# Patient Record
Sex: MALE | Race: WHITE | NOT HISPANIC OR LATINO | Employment: OTHER | URBAN - METROPOLITAN AREA
[De-identification: names, ages, dates, MRNs, and addresses within clinical notes are randomized per-mention and may not be internally consistent; named-entity substitution may affect disease eponyms.]

---

## 2017-12-19 ENCOUNTER — HOSPITAL ENCOUNTER (OUTPATIENT)
Dept: GENERAL RADIOLOGY | Facility: HOSPITAL | Age: 62
Discharge: HOME | End: 2017-12-19
Admitting: NURSE PRACTITIONER

## 2017-12-19 ENCOUNTER — TRANSCRIBE ORDERS (OUTPATIENT)
Dept: ADMINISTRATIVE | Facility: HOSPITAL | Age: 62
End: 2017-12-19

## 2017-12-19 DIAGNOSIS — M79.672 LEFT FOOT PAIN: Primary | ICD-10-CM

## 2017-12-19 PROCEDURE — 73630 X-RAY EXAM OF FOOT: CPT

## 2019-01-15 RX ORDER — ALLOPURINOL 300 MG/1
TABLET ORAL
Qty: 90 TABLET | Refills: 1 | Status: SHIPPED | OUTPATIENT
Start: 2019-01-15 | End: 2019-07-14 | Stop reason: SDUPTHER

## 2019-01-15 RX ORDER — ATORVASTATIN CALCIUM 40 MG/1
TABLET, FILM COATED ORAL
Qty: 90 TABLET | Refills: 1 | Status: SHIPPED | OUTPATIENT
Start: 2019-01-15 | End: 2019-07-04 | Stop reason: SDUPTHER

## 2019-01-22 RX ORDER — TESTOSTERONE 30 MG/1.5ML
SOLUTION TOPICAL
Qty: 270 ML | Refills: 0 | Status: SHIPPED | OUTPATIENT
Start: 2019-01-22 | End: 2019-05-01 | Stop reason: SDUPTHER

## 2019-02-07 RX ORDER — WARFARIN SODIUM 5 MG/1
TABLET ORAL
Qty: 45 TABLET | Refills: 0 | Status: SHIPPED | OUTPATIENT
Start: 2019-02-07 | End: 2019-03-15 | Stop reason: SDUPTHER

## 2019-02-13 ENCOUNTER — CLINICAL SUPPORT (OUTPATIENT)
Dept: INTERNAL MEDICINE | Facility: CLINIC | Age: 64
End: 2019-02-13

## 2019-02-13 DIAGNOSIS — I48.91 ATRIAL FIBRILLATION, UNSPECIFIED TYPE (HCC): Primary | ICD-10-CM

## 2019-02-13 PROCEDURE — 85610 PROTHROMBIN TIME: CPT | Performed by: INTERNAL MEDICINE

## 2019-02-14 LAB
EXPIRATION DATE: ABNORMAL
INR PPP: 2.3 (ref 0.9–1.1)
Lab: ABNORMAL
PROTHROMBIN TIME: 27.3 SECONDS

## 2019-03-05 PROBLEM — J45.30 MILD PERSISTENT ASTHMA: Status: ACTIVE | Noted: 2019-03-05

## 2019-03-05 PROBLEM — K21.9 GERD (GASTROESOPHAGEAL REFLUX DISEASE): Status: ACTIVE | Noted: 2019-03-05

## 2019-03-05 PROBLEM — R03.0 ELEVATED BLOOD PRESSURE READING: Status: ACTIVE | Noted: 2019-03-05

## 2019-03-05 PROBLEM — E78.5 HLD (HYPERLIPIDEMIA): Status: ACTIVE | Noted: 2019-03-05

## 2019-03-05 PROBLEM — M79.673 FOOT PAIN: Status: ACTIVE | Noted: 2019-03-05

## 2019-03-05 PROBLEM — I26.99 PULMONARY EMBOLISM (HCC): Status: ACTIVE | Noted: 2019-03-05

## 2019-03-05 PROBLEM — I10 BENIGN ESSENTIAL HYPERTENSION: Status: ACTIVE | Noted: 2019-03-05

## 2019-03-05 PROBLEM — I87.009 POSTPHLEBITIC SYNDROME: Status: ACTIVE | Noted: 2019-03-05

## 2019-03-05 PROBLEM — D68.59 HYPERCOAGULABLE STATE: Status: ACTIVE | Noted: 2019-03-05

## 2019-03-05 PROBLEM — E29.1 MALE HYPOGONADISM: Status: ACTIVE | Noted: 2019-03-05

## 2019-03-05 PROBLEM — R06.09 DYSPNEA ON EXERTION: Status: ACTIVE | Noted: 2019-03-05

## 2019-03-05 PROBLEM — J30.9 AR (ALLERGIC RHINITIS): Status: ACTIVE | Noted: 2019-03-05

## 2019-03-15 RX ORDER — WARFARIN SODIUM 5 MG/1
TABLET ORAL
Qty: 45 TABLET | Refills: 0 | Status: SHIPPED | OUTPATIENT
Start: 2019-03-15 | End: 2019-04-21 | Stop reason: SDUPTHER

## 2019-03-28 ENCOUNTER — TELEPHONE (OUTPATIENT)
Dept: INTERNAL MEDICINE | Facility: CLINIC | Age: 64
End: 2019-03-28

## 2019-03-28 DIAGNOSIS — D68.59 HYPERCOAGULABLE STATE (HCC): Primary | ICD-10-CM

## 2019-03-28 RX ORDER — LISINOPRIL 5 MG/1
TABLET ORAL
Qty: 90 TABLET | Refills: 1 | Status: SHIPPED | OUTPATIENT
Start: 2019-03-28 | End: 2019-09-15 | Stop reason: SDUPTHER

## 2019-03-29 ENCOUNTER — CLINICAL SUPPORT (OUTPATIENT)
Dept: INTERNAL MEDICINE | Facility: CLINIC | Age: 64
End: 2019-03-29

## 2019-03-29 DIAGNOSIS — D68.59 HYPERCOAGULABLE STATE (HCC): Primary | ICD-10-CM

## 2019-03-29 LAB
EXPIRATION DATE: ABNORMAL
INR PPP: 3.3 (ref 0.9–1.1)
Lab: ABNORMAL
PROTHROMBIN TIME: 39.6 SECONDS

## 2019-03-29 PROCEDURE — 85610 PROTHROMBIN TIME: CPT | Performed by: INTERNAL MEDICINE

## 2019-04-01 ENCOUNTER — OFFICE VISIT (OUTPATIENT)
Dept: INTERNAL MEDICINE | Facility: CLINIC | Age: 64
End: 2019-04-01

## 2019-04-01 VITALS
HEIGHT: 75 IN | SYSTOLIC BLOOD PRESSURE: 136 MMHG | WEIGHT: 256.5 LBS | DIASTOLIC BLOOD PRESSURE: 84 MMHG | HEART RATE: 72 BPM | BODY MASS INDEX: 31.89 KG/M2

## 2019-04-01 DIAGNOSIS — Z12.5 PROSTATE CANCER SCREENING: ICD-10-CM

## 2019-04-01 DIAGNOSIS — E78.2 MIXED HYPERLIPIDEMIA: ICD-10-CM

## 2019-04-01 DIAGNOSIS — D68.59 HYPERCOAGULABLE STATE (HCC): ICD-10-CM

## 2019-04-01 DIAGNOSIS — I10 BENIGN ESSENTIAL HYPERTENSION: Primary | ICD-10-CM

## 2019-04-01 DIAGNOSIS — I87.009 POSTPHLEBITIC SYNDROME: ICD-10-CM

## 2019-04-01 DIAGNOSIS — K21.9 GASTROESOPHAGEAL REFLUX DISEASE WITHOUT ESOPHAGITIS: ICD-10-CM

## 2019-04-01 DIAGNOSIS — E29.1 MALE HYPOGONADISM: ICD-10-CM

## 2019-04-01 DIAGNOSIS — I26.99 OTHER PULMONARY EMBOLISM WITHOUT ACUTE COR PULMONALE, UNSPECIFIED CHRONICITY (HCC): ICD-10-CM

## 2019-04-01 LAB
ALBUMIN SERPL-MCNC: 4.69 G/DL (ref 3.2–4.8)
ALBUMIN/GLOB SERPL: 2 G/DL (ref 1.5–2.5)
ALP SERPL-CCNC: 125 U/L (ref 25–100)
ALT SERPL W P-5'-P-CCNC: 34 U/L (ref 7–40)
ANION GAP SERPL CALCULATED.3IONS-SCNC: 8 MMOL/L (ref 3–11)
ARTICHOKE IGE QN: 97 MG/DL (ref 0–130)
AST SERPL-CCNC: 29 U/L (ref 0–33)
BASOPHILS # BLD AUTO: 0.04 10*3/MM3 (ref 0–0.2)
BASOPHILS NFR BLD AUTO: 0.8 % (ref 0–1)
BILIRUB SERPL-MCNC: 1.4 MG/DL (ref 0.3–1.2)
BUN BLD-MCNC: 15 MG/DL (ref 9–23)
BUN/CREAT SERPL: 12.3 (ref 7–25)
CALCIUM SPEC-SCNC: 9.8 MG/DL (ref 8.7–10.4)
CHLORIDE SERPL-SCNC: 103 MMOL/L (ref 99–109)
CHOLEST SERPL-MCNC: 136 MG/DL (ref 0–200)
CO2 SERPL-SCNC: 27 MMOL/L (ref 20–31)
CREAT BLD-MCNC: 1.22 MG/DL (ref 0.6–1.3)
DEPRECATED RDW RBC AUTO: 49.9 FL (ref 37–54)
EOSINOPHIL # BLD AUTO: 0.05 10*3/MM3 (ref 0–0.3)
EOSINOPHIL NFR BLD AUTO: 1.1 % (ref 0–3)
ERYTHROCYTE [DISTWIDTH] IN BLOOD BY AUTOMATED COUNT: 14.9 % (ref 11.3–14.5)
GFR SERPL CREATININE-BSD FRML MDRD: 60 ML/MIN/1.73
GLOBULIN UR ELPH-MCNC: 2.3 GM/DL
GLUCOSE BLD-MCNC: 86 MG/DL (ref 70–100)
HCT VFR BLD AUTO: 46.8 % (ref 38.9–50.9)
HDLC SERPL-MCNC: 27 MG/DL (ref 40–60)
HGB BLD-MCNC: 15.7 G/DL (ref 13.1–17.5)
IMM GRANULOCYTES # BLD AUTO: 0.01 10*3/MM3 (ref 0–0.05)
IMM GRANULOCYTES NFR BLD AUTO: 0.2 % (ref 0–0.6)
LYMPHOCYTES # BLD AUTO: 1.09 10*3/MM3 (ref 0.6–4.8)
LYMPHOCYTES NFR BLD AUTO: 23 % (ref 24–44)
MCH RBC QN AUTO: 30.6 PG (ref 27–31)
MCHC RBC AUTO-ENTMCNC: 33.5 G/DL (ref 32–36)
MCV RBC AUTO: 91.2 FL (ref 80–99)
MONOCYTES # BLD AUTO: 0.27 10*3/MM3 (ref 0–1)
MONOCYTES NFR BLD AUTO: 5.7 % (ref 0–12)
NEUTROPHILS # BLD AUTO: 3.29 10*3/MM3 (ref 1.5–8.3)
NEUTROPHILS NFR BLD AUTO: 69.4 % (ref 41–71)
PLATELET # BLD AUTO: 150 10*3/MM3 (ref 150–450)
PMV BLD AUTO: 10.5 FL (ref 6–12)
POTASSIUM BLD-SCNC: 4.9 MMOL/L (ref 3.5–5.5)
PROT SERPL-MCNC: 7 G/DL (ref 5.7–8.2)
PSA SERPL-MCNC: 1.29 NG/ML (ref 0–4)
RBC # BLD AUTO: 5.13 10*6/MM3 (ref 4.2–5.76)
SODIUM BLD-SCNC: 138 MMOL/L (ref 132–146)
TESTOST SERPL-MCNC: 281.33 NG/DL (ref 86.98–780.1)
TRIGL SERPL-MCNC: 98 MG/DL (ref 0–150)
WBC NRBC COR # BLD: 4.74 10*3/MM3 (ref 3.5–10.8)

## 2019-04-01 PROCEDURE — G0103 PSA SCREENING: HCPCS | Performed by: INTERNAL MEDICINE

## 2019-04-01 PROCEDURE — 84403 ASSAY OF TOTAL TESTOSTERONE: CPT | Performed by: INTERNAL MEDICINE

## 2019-04-01 PROCEDURE — 99396 PREV VISIT EST AGE 40-64: CPT | Performed by: INTERNAL MEDICINE

## 2019-04-01 PROCEDURE — 80061 LIPID PANEL: CPT | Performed by: INTERNAL MEDICINE

## 2019-04-01 PROCEDURE — 85025 COMPLETE CBC W/AUTO DIFF WBC: CPT | Performed by: INTERNAL MEDICINE

## 2019-04-01 PROCEDURE — 82043 UR ALBUMIN QUANTITATIVE: CPT | Performed by: INTERNAL MEDICINE

## 2019-04-01 PROCEDURE — 36415 COLL VENOUS BLD VENIPUNCTURE: CPT | Performed by: INTERNAL MEDICINE

## 2019-04-01 PROCEDURE — 82570 ASSAY OF URINE CREATININE: CPT | Performed by: INTERNAL MEDICINE

## 2019-04-01 PROCEDURE — 80053 COMPREHEN METABOLIC PANEL: CPT | Performed by: INTERNAL MEDICINE

## 2019-04-01 RX ORDER — FAMOTIDINE 20 MG/1
1 TABLET, FILM COATED ORAL AS NEEDED
COMMUNITY
Start: 2015-03-25

## 2019-04-01 NOTE — PROGRESS NOTES
[facility]     Joseph Lundberg  1955   [id number]      Patient Care Team:  Jairo Carter MD as PCP - General (Internal Medicine)    Chief Complaint::   Chief Complaint   Patient presents with   • Annual Exam        HPI  Mr Lundberg is now 63.  He comes in for follow-up of his hypertension, hypercoagulable state, GERD, hyperlipidemia, hypogonadism, postphlebitic syndrome and for annual examination.  Overall he feels well.  He admits he needs to exercise more but feels well and has no shortness of breath, his strength and stamina are good.  He has no complaints    Chronic Conditions:      Patient Active Problem List   Diagnosis   • AR (allergic rhinitis)   • Benign essential hypertension   • Dyspnea on exertion   • Elevated blood pressure reading   • Foot pain   • GERD (gastroesophageal reflux disease)   • Hypercoagulable state (CMS/HCC)   • HLD (hyperlipidemia)   • Male hypogonadism   • Mild persistent asthma   • Postphlebitic syndrome   • Pulmonary embolism (CMS/HCC)        Past Medical History:   Diagnosis Date   • Achilles tendon injury    • Nephrolithiasis      (uric acid/calcium)   • Pulmonary embolism (CMS/HCC) 2011   • Pulmonary embolism (CMS/HCC) 2002    DVT   • Pulmonary embolism (CMS/HCC) 2010    DVT    • Torn meniscus 2002    left    • Torn meniscus 2008    left       Past Surgical History:   Procedure Laterality Date   • ACHILLES TENDON REPAIR  2010   • FL CYSTO URETHROSCOPY W REM FB     • KNEE ARTHROSCOPY W/ MENISCAL REPAIR Left 2008   • KNEE ARTHROSCOPY W/ MENISCAL REPAIR Left 2002   • TONSILLECTOMY     • VASECTOMY  1994       Family History   Problem Relation Age of Onset   • Heart attack Mother 73   • Heart attack Father 28        and 62   • Coronary artery disease Brother 43        premature    • Heart attack Paternal Uncle 61       Social History     Socioeconomic History   • Marital status:      Spouse name: Not on file   • Number of children: Not on file   • Years  of education: Not on file   • Highest education level: Not on file   Tobacco Use   • Smoking status: Never Smoker       Allergies   Allergen Reactions   • Penicillins Unknown (See Comments)     Unknown         Current Outpatient Medications:   •  allopurinol (ZYLOPRIM) 300 MG tablet, TAKE 1 TABLET DAILY, Disp: 90 tablet, Rfl: 1  •  atorvastatin (LIPITOR) 40 MG tablet, TAKE 1 TABLET DAILY, Disp: 90 tablet, Rfl: 1  •  famotidine (PEPCID) 20 MG tablet, Take 1 tablet by mouth As Needed., Disp: , Rfl:   •  lisinopril (PRINIVIL,ZESTRIL) 5 MG tablet, TAKE 1 TABLET DAILY, Disp: 90 tablet, Rfl: 1  •  Testosterone 30 MG/ACT solution, APPLY 1 PUMP TO EACH UNDERARM EVERY MORNING, Disp: 270 mL, Rfl: 0  •  warfarin (COUMADIN) 5 MG tablet, TAKE 1 TABLET BY MOUTH EVERY DAY ALTERNATING WITH 1.5 TABLETS EVERY OTHER DAY, Disp: 45 tablet, Rfl: 0    Immunization History   Administered Date(s) Administered   • Influenza, Unspecified 10/21/2015        Health Maintenance Due   Topic Date Due   • TDAP/TD VACCINES (1 - Tdap) 11/01/1976   • ZOSTER VACCINE (1 of 2) 11/01/2007   • HEPATITIS C SCREENING  01/21/2019   • ANNUAL PHYSICAL  03/03/2019        Review of Systems   Constitutional: Negative for chills, fatigue and fever.   HENT: Negative for congestion, ear pain and sinus pressure.    Respiratory: Negative for cough, chest tightness, shortness of breath and wheezing.    Cardiovascular: Negative for chest pain and palpitations.   Gastrointestinal: Negative for abdominal pain, blood in stool and constipation.   Skin: Negative for color change.   Allergic/Immunologic: Negative for environmental allergies.   Neurological: Negative for dizziness, speech difficulty and headache.   Psychiatric/Behavioral: Negative for decreased concentration. The patient is not nervous/anxious.         Vital Signs  Vitals:    04/01/19 1553   BP: 136/84   BP Location: Left arm   Patient Position: Sitting   Cuff Size: Adult   Pulse: 72   Weight: 116 kg (256 lb 8  "oz)   Height: 190.5 cm (75\")       Physical Exam   Constitutional: He is oriented to person, place, and time. He appears well-developed and well-nourished.   HENT:   Head: Normocephalic and atraumatic.   Right Ear: External ear normal.   Left Ear: External ear normal.   Nose: Nose normal.   Mouth/Throat: Oropharynx is clear and moist. No oropharyngeal exudate.   Eyes: Conjunctivae and EOM are normal. Pupils are equal, round, and reactive to light.   Neck: Normal range of motion. Neck supple. No JVD present. No thyromegaly present.   Cardiovascular: Normal rate, regular rhythm, normal heart sounds and intact distal pulses. Exam reveals no gallop and no friction rub.   No murmur heard.  Pulmonary/Chest: Effort normal and breath sounds normal. No respiratory distress. He has no wheezes. He has no rales. He exhibits no tenderness.   Abdominal: Soft. Bowel sounds are normal. He exhibits no distension and no mass. There is no tenderness. There is no rebound and no guarding. No hernia.   Musculoskeletal: Normal range of motion. He exhibits no tenderness.   Lymphadenopathy:     He has no cervical adenopathy.   Neurological: He is alert and oriented to person, place, and time. He displays normal reflexes. No cranial nerve deficit or sensory deficit. He exhibits normal muscle tone. Coordination normal.   Skin: Skin is warm and dry. No rash noted. No erythema.   Diffuse seborrheic keratoses.   Psychiatric: He has a normal mood and affect. His behavior is normal. Judgment and thought content normal.   Nursing note and vitals reviewed.     Procedures      Assessment/Plan:    Preventive care, overall he remains in good health.  Encouraged him to improve fitness and diet.  His BMI, at 32, is down from 33.9 last year.  Colonoscopy was performed in 2015, due at 5 years.    Hypertension, controlled with lisinopril    Hypercoagulable state, on lifelong anticoagulation.    Postphlebitic syndrome, he faithfully wears " compression    Hypogonadism, continue topical testosterone.    Hyperlipidemia, continue atorvastatin, results pending.        Labs  Results for orders placed or performed in visit on 04/01/19   Microalbumin / Creatinine Urine Ratio - Urine, Clean Catch   Result Value Ref Range    Creatinine, Urine 143.7 Not Estab. mg/dL    Microalbumin, Urine 4.0 Not Estab. ug/mL    Microalbumin/Creatinine Ratio 2.8 0.0 - 30.0 mg/g creat   Comprehensive Metabolic Panel   Result Value Ref Range    Glucose 86 70 - 100 mg/dL    BUN 15 9 - 23 mg/dL    Creatinine 1.22 0.60 - 1.30 mg/dL    Sodium 138 132 - 146 mmol/L    Potassium 4.9 3.5 - 5.5 mmol/L    Chloride 103 99 - 109 mmol/L    CO2 27.0 20.0 - 31.0 mmol/L    Calcium 9.8 8.7 - 10.4 mg/dL    Total Protein 7.0 5.7 - 8.2 g/dL    Albumin 4.69 3.20 - 4.80 g/dL    ALT (SGPT) 34 7 - 40 U/L    AST (SGOT) 29 0 - 33 U/L    Alkaline Phosphatase 125 (H) 25 - 100 U/L    Total Bilirubin 1.4 (H) 0.3 - 1.2 mg/dL    eGFR Non African Amer 60 (L) >60 mL/min/1.73    Globulin 2.3 gm/dL    A/G Ratio 2.0 1.5 - 2.5 g/dL    BUN/Creatinine Ratio 12.3 7.0 - 25.0    Anion Gap 8.0 3.0 - 11.0 mmol/L   Lipid Panel   Result Value Ref Range    Total Cholesterol 136 0 - 200 mg/dL    Triglycerides 98 0 - 150 mg/dL    HDL Cholesterol 27 (L) 40 - 60 mg/dL    LDL Cholesterol  97 0 - 130 mg/dL   PSA Screen   Result Value Ref Range    PSA 1.290 0.000 - 4.000 ng/mL   Testosterone   Result Value Ref Range    Testosterone, Total 281.33 86.98 - 780.10 ng/dL   CBC Auto Differential   Result Value Ref Range    WBC 4.74 3.50 - 10.80 10*3/mm3    RBC 5.13 4.20 - 5.76 10*6/mm3    Hemoglobin 15.7 13.1 - 17.5 g/dL    Hematocrit 46.8 38.9 - 50.9 %    MCV 91.2 80.0 - 99.0 fL    MCH 30.6 27.0 - 31.0 pg    MCHC 33.5 32.0 - 36.0 g/dL    RDW 14.9 (H) 11.3 - 14.5 %    RDW-SD 49.9 37.0 - 54.0 fl    MPV 10.5 6.0 - 12.0 fL    Platelets 150 150 - 450 10*3/mm3    Neutrophil % 69.4 41.0 - 71.0 %    Lymphocyte % 23.0 (L) 24.0 - 44.0 %    Monocyte  % 5.7 0.0 - 12.0 %    Eosinophil % 1.1 0.0 - 3.0 %    Basophil % 0.8 0.0 - 1.0 %    Immature Grans % 0.2 0.0 - 0.6 %    Neutrophils, Absolute 3.29 1.50 - 8.30 10*3/mm3    Lymphocytes, Absolute 1.09 0.60 - 4.80 10*3/mm3    Monocytes, Absolute 0.27 0.00 - 1.00 10*3/mm3    Eosinophils, Absolute 0.05 0.00 - 0.30 10*3/mm3    Basophils, Absolute 0.04 0.00 - 0.20 10*3/mm3    Immature Grans, Absolute 0.01 0.00 - 0.05 10*3/mm3        Counseling was given to patient for the following topics: appropriate exercise 150 minutes per week, disease prevention and healthy eating habits.    Plan of care reviewed with patient at the conclusion of today's visit. Education was provided regarding diagnosis, management, and any prescribed or recommended OTC medications.Patient verbalizes understanding of and agreement with management plan.         Jairo Carter MD

## 2019-04-02 LAB
CREAT 24H UR-MCNC: 143.7 MG/DL
MICROALBUMIN UR-MCNC: 4 UG/ML
MICROALBUMIN/CREAT UR: 2.8 MG/G CREAT (ref 0–30)

## 2019-04-22 RX ORDER — WARFARIN SODIUM 5 MG/1
TABLET ORAL
Qty: 45 TABLET | Refills: 0 | Status: SHIPPED | OUTPATIENT
Start: 2019-04-22 | End: 2019-05-22 | Stop reason: SDUPTHER

## 2019-05-01 RX ORDER — TESTOSTERONE 30 MG/1.5ML
SOLUTION TOPICAL
Qty: 270 ML | Refills: 0 | Status: SHIPPED | OUTPATIENT
Start: 2019-05-01 | End: 2020-01-15 | Stop reason: SDUPTHER

## 2019-05-23 RX ORDER — WARFARIN SODIUM 5 MG/1
TABLET ORAL
Qty: 45 TABLET | Refills: 0 | Status: SHIPPED | OUTPATIENT
Start: 2019-05-23 | End: 2019-07-08 | Stop reason: SDUPTHER

## 2019-06-03 ENCOUNTER — CLINICAL SUPPORT (OUTPATIENT)
Dept: INTERNAL MEDICINE | Facility: CLINIC | Age: 64
End: 2019-06-03

## 2019-06-03 DIAGNOSIS — D68.59 HYPERCOAGULABLE STATE (HCC): Primary | ICD-10-CM

## 2019-06-03 LAB
INR PPP: 2.6 (ref 0.9–1.1)
PROTHROMBIN TIME: 35.3 SECONDS

## 2019-06-03 PROCEDURE — 85610 PROTHROMBIN TIME: CPT | Performed by: INTERNAL MEDICINE

## 2019-06-03 PROCEDURE — 36416 COLLJ CAPILLARY BLOOD SPEC: CPT | Performed by: INTERNAL MEDICINE

## 2019-07-05 RX ORDER — ATORVASTATIN CALCIUM 40 MG/1
TABLET, FILM COATED ORAL
Qty: 90 TABLET | Refills: 1 | Status: SHIPPED | OUTPATIENT
Start: 2019-07-05 | End: 2019-12-30

## 2019-07-09 RX ORDER — WARFARIN SODIUM 5 MG/1
TABLET ORAL
Qty: 45 TABLET | Refills: 0 | Status: SHIPPED | OUTPATIENT
Start: 2019-07-09 | End: 2019-07-24 | Stop reason: SDUPTHER

## 2019-07-15 RX ORDER — ALLOPURINOL 300 MG/1
TABLET ORAL
Qty: 90 TABLET | Refills: 1 | Status: SHIPPED | OUTPATIENT
Start: 2019-07-15 | End: 2019-12-30

## 2019-07-24 RX ORDER — WARFARIN SODIUM 5 MG/1
TABLET ORAL
Qty: 45 TABLET | Refills: 0 | Status: SHIPPED | OUTPATIENT
Start: 2019-07-24 | End: 2019-09-20 | Stop reason: SDUPTHER

## 2019-08-23 ENCOUNTER — OFFICE VISIT (OUTPATIENT)
Dept: INTERNAL MEDICINE | Facility: CLINIC | Age: 64
End: 2019-08-23

## 2019-08-23 VITALS
DIASTOLIC BLOOD PRESSURE: 76 MMHG | BODY MASS INDEX: 32.08 KG/M2 | TEMPERATURE: 97.9 F | SYSTOLIC BLOOD PRESSURE: 122 MMHG | HEIGHT: 75 IN | HEART RATE: 60 BPM | WEIGHT: 258 LBS

## 2019-08-23 DIAGNOSIS — J01.00 ACUTE NON-RECURRENT MAXILLARY SINUSITIS: Primary | ICD-10-CM

## 2019-08-23 PROCEDURE — 99213 OFFICE O/P EST LOW 20 MIN: CPT | Performed by: INTERNAL MEDICINE

## 2019-08-23 RX ORDER — BENZONATATE 100 MG/1
100 CAPSULE ORAL 3 TIMES DAILY PRN
Qty: 30 CAPSULE | Refills: 0 | Status: SHIPPED | OUTPATIENT
Start: 2019-08-23 | End: 2019-11-08

## 2019-08-23 RX ORDER — CEFDINIR 300 MG/1
300 CAPSULE ORAL 2 TIMES DAILY
Qty: 10 CAPSULE | Refills: 0 | Status: SHIPPED | OUTPATIENT
Start: 2019-08-23 | End: 2019-11-08

## 2019-08-23 RX ORDER — GUAIFENESIN AND DEXTROMETHORPHAN HYDROBROMIDE 600; 30 MG/1; MG/1
1 TABLET, EXTENDED RELEASE ORAL 2 TIMES DAILY PRN
COMMUNITY
Start: 2013-05-21 | End: 2020-01-09

## 2019-08-23 NOTE — PROGRESS NOTES
"[facility]     Joseph Lundberg  1955   [id number]      Patient Care Team:  Jairo Carter MD as PCP - General (Internal Medicine)    Chief Complaint::   Chief Complaint   Patient presents with   • URI   • Cough   • Headache        HPI  Mr Lundberg comes in complaining of a history of nasal and sinus congestion.  His symptoms began as a \"cold.\"  However the past 2 to 3 days he has had increasing facial pain and purulent nasal drainage, postnasal drainage, cough and bad taste in his mouth.  No Fever or chills.    Chronic Conditions:      Patient Active Problem List   Diagnosis   • AR (allergic rhinitis)   • Benign essential hypertension   • Dyspnea on exertion   • Foot pain   • GERD (gastroesophageal reflux disease)   • Hypercoagulable state (CMS/HCC)   • HLD (hyperlipidemia)   • Male hypogonadism   • Mild persistent asthma   • Postphlebitic syndrome   • Pulmonary embolism (CMS/HCC)        Past Medical History:   Diagnosis Date   • Achilles tendon injury    • Nephrolithiasis      (uric acid/calcium)   • Pulmonary embolism (CMS/[facility]) 2011   • Pulmonary embolism (CMS/HCC) 2002    DVT   • Pulmonary embolism (CMS/HCC) 2010    DVT    • Torn meniscus 2002    left    • Torn meniscus 2008    left       Past Surgical History:   Procedure Laterality Date   • ACHILLES TENDON REPAIR  2010   • FL CYSTO URETHROSCOPY W REM FB     • KNEE ARTHROSCOPY W/ MENISCAL REPAIR Left 2008   • KNEE ARTHROSCOPY W/ MENISCAL REPAIR Left 2002   • TONSILLECTOMY     • VASECTOMY  1994       Family History   Problem Relation Age of Onset   • Heart attack Mother 73   • Heart attack Father 28        and 62   • Coronary artery disease Brother 43        premature    • Heart attack Paternal Uncle 61       Social History     Socioeconomic History   • Marital status:      Spouse name: Not on file   • Number of children: Not on file   • Years of education: Not on file   • Highest education level: Not on file   Tobacco Use   • " Smoking status: Never Smoker       Allergies   Allergen Reactions   • Penicillins Unknown (See Comments)     Unknown         Current Outpatient Medications:   •  allopurinol (ZYLOPRIM) 300 MG tablet, TAKE 1 TABLET DAILY, Disp: 90 tablet, Rfl: 1  •  atorvastatin (LIPITOR) 40 MG tablet, TAKE 1 TABLET DAILY, Disp: 90 tablet, Rfl: 1  •  Cetirizine HCl (ZYRTEC ALLERGY) 10 MG capsule, Take 1 capsule by mouth Daily., Disp: , Rfl:   •  famotidine (PEPCID) 20 MG tablet, Take 1 tablet by mouth As Needed., Disp: , Rfl:   •  guaifenesin-dextromethorphan (MUCINEX DM)  MG tablet sustained-release 12 hour tablet, Take 1 tablet by mouth Every 12 (Twelve) Hours., Disp: , Rfl:   •  lisinopril (PRINIVIL,ZESTRIL) 5 MG tablet, TAKE 1 TABLET DAILY, Disp: 90 tablet, Rfl: 1  •  Testosterone 30 MG/ACT solution, APPLY 1 PUMP TO EACH UNDERARM EVERY MORNING, Disp: 270 mL, Rfl: 0  •  warfarin (COUMADIN) 5 MG tablet, Take 1 tab every other day alternating with 1.5 every other day, Disp: 45 tablet, Rfl: 0  •  benzonatate (TESSALON PERLES) 100 MG capsule, Take 1 capsule by mouth 3 (Three) Times a Day As Needed for Cough., Disp: 30 capsule, Rfl: 0  •  cefdinir (OMNICEF) 300 MG capsule, Take 1 capsule by mouth 2 (Two) Times a Day., Disp: 10 capsule, Rfl: 0    Review of Systems   Constitutional: Positive for fatigue. Negative for chills and fever.   HENT: Positive for congestion and sinus pressure. Negative for ear pain.    Respiratory: Positive for cough, chest tightness, shortness of breath and wheezing.    Cardiovascular: Negative for chest pain and palpitations.   Gastrointestinal: Negative for abdominal pain, blood in stool and constipation.   Skin: Negative for color change.   Allergic/Immunologic: Negative for environmental allergies.   Neurological: Positive for dizziness and headache. Negative for speech difficulty.   Psychiatric/Behavioral: Negative for decreased concentration. The patient is not nervous/anxious.         Vital  "Signs  Vitals:    08/23/19 1644   BP: 122/76   BP Location: Right arm   Patient Position: Sitting   Cuff Size: Adult   Pulse: 60   Temp: 97.9 °F (36.6 °C)   Weight: 117 kg (258 lb)   Height: 190.5 cm (75\")   PainSc:   4   PainLoc: Head       Physical Exam   Constitutional: He is oriented to person, place, and time. He appears well-developed and well-nourished.   HENT:   Head: Normocephalic and atraumatic.   Right Ear: Tympanic membrane is bulging.   Left Ear: Tympanic membrane is bulging.   Nose: Right sinus exhibits maxillary sinus tenderness. Left sinus exhibits maxillary sinus tenderness.   Mouth/Throat: No posterior oropharyngeal erythema.   Cardiovascular: Normal rate, regular rhythm and normal heart sounds.   No murmur heard.  Pulmonary/Chest: Effort normal and breath sounds normal.   Neurological: He is alert and oriented to person, place, and time.   Psychiatric: He has a normal mood and affect.   Vitals reviewed.     Procedures    ACE III MINI             Assessment/Plan:    Joseph was seen today for uri, cough and headache.    Diagnoses and all orders for this visit:    Acute non-recurrent maxillary sinusitis    Other orders  -     benzonatate (TESSALON PERLES) 100 MG capsule; Take 1 capsule by mouth 3 (Three) Times a Day As Needed for Cough.  -     cefdinir (OMNICEF) 300 MG capsule; Take 1 capsule by mouth 2 (Two) Times a Day.    Plan    At this point, after 7 days, now with worsening symptoms, recommend antibiotics, decongestants.       Plan of care reviewed with patient at the conclusion of today's visit. Education was provided regarding diagnosis, management, and any prescribed or recommended OTC medications.Patient verbalizes understanding of and agreement with management plan.         Jairo Carter MD           "

## 2019-09-16 RX ORDER — LISINOPRIL 5 MG/1
TABLET ORAL
Qty: 90 TABLET | Refills: 1 | Status: SHIPPED | OUTPATIENT
Start: 2019-09-16 | End: 2020-03-12

## 2019-09-20 ENCOUNTER — CLINICAL SUPPORT (OUTPATIENT)
Dept: INTERNAL MEDICINE | Facility: CLINIC | Age: 64
End: 2019-09-20

## 2019-09-20 DIAGNOSIS — D68.59 HYPERCOAGULABLE STATE (HCC): Primary | ICD-10-CM

## 2019-09-20 LAB
INR PPP: 2.6 (ref 0.9–1.1)
PROTHROMBIN TIME: 31.6 SECONDS

## 2019-09-20 PROCEDURE — 36416 COLLJ CAPILLARY BLOOD SPEC: CPT | Performed by: INTERNAL MEDICINE

## 2019-09-20 PROCEDURE — 85610 PROTHROMBIN TIME: CPT | Performed by: INTERNAL MEDICINE

## 2019-09-20 RX ORDER — WARFARIN SODIUM 5 MG/1
TABLET ORAL
Qty: 45 TABLET | Refills: 0 | Status: SHIPPED | OUTPATIENT
Start: 2019-09-20 | End: 2019-10-23 | Stop reason: SDUPTHER

## 2019-10-24 RX ORDER — WARFARIN SODIUM 5 MG/1
TABLET ORAL
Qty: 45 TABLET | Refills: 0 | Status: SHIPPED | OUTPATIENT
Start: 2019-10-24 | End: 2019-11-26 | Stop reason: SDUPTHER

## 2019-11-08 ENCOUNTER — OFFICE VISIT (OUTPATIENT)
Dept: INTERNAL MEDICINE | Facility: CLINIC | Age: 64
End: 2019-11-08

## 2019-11-08 VITALS
HEIGHT: 75 IN | DIASTOLIC BLOOD PRESSURE: 80 MMHG | HEART RATE: 60 BPM | WEIGHT: 257 LBS | SYSTOLIC BLOOD PRESSURE: 118 MMHG | BODY MASS INDEX: 31.95 KG/M2

## 2019-11-08 DIAGNOSIS — D68.59 HYPERCOAGULABLE STATE (HCC): ICD-10-CM

## 2019-11-08 DIAGNOSIS — I87.009 POSTPHLEBITIC SYNDROME: Primary | ICD-10-CM

## 2019-11-08 DIAGNOSIS — H01.00B BLEPHARITIS OF BOTH UPPER AND LOWER EYELID OF LEFT EYE, UNSPECIFIED TYPE: ICD-10-CM

## 2019-11-08 LAB
INR PPP: 2.9 (ref 0.9–1.1)
PROTHROMBIN TIME: 34.9 SECONDS

## 2019-11-08 PROCEDURE — 90471 IMMUNIZATION ADMIN: CPT | Performed by: INTERNAL MEDICINE

## 2019-11-08 PROCEDURE — 99213 OFFICE O/P EST LOW 20 MIN: CPT | Performed by: INTERNAL MEDICINE

## 2019-11-08 PROCEDURE — 85610 PROTHROMBIN TIME: CPT | Performed by: INTERNAL MEDICINE

## 2019-11-08 PROCEDURE — 36416 COLLJ CAPILLARY BLOOD SPEC: CPT | Performed by: INTERNAL MEDICINE

## 2019-11-08 PROCEDURE — 90674 CCIIV4 VAC NO PRSV 0.5 ML IM: CPT | Performed by: INTERNAL MEDICINE

## 2019-11-08 NOTE — PROGRESS NOTES
[facility]     Joseph Lundberg  1955   [id number]      Patient Care Team:  Jairo Carter MD as PCP - General (Internal Medicine)    Chief Complaint::   Chief Complaint   Patient presents with   • Eye Problem     left         HPI  Mr Lundberg comes in complaining of swelling around the left eye for 1 week duration.  He initially had swelling in the upper lid but now in both upper and lower lids on the left.  No change in vision, no pain but he does have some irritation in the swollen tissues.  There has been no purulent drainage.    Chronic Conditions:      Patient Active Problem List   Diagnosis   • AR (allergic rhinitis)   • Benign essential hypertension   • Dyspnea on exertion   • Foot pain   • GERD (gastroesophageal reflux disease)   • Hypercoagulable state (CMS/HCC)   • HLD (hyperlipidemia)   • Male hypogonadism   • Mild persistent asthma   • Postphlebitic syndrome   • Pulmonary embolism (CMS/HCC)        Past Medical History:   Diagnosis Date   • Achilles tendon injury    • Nephrolithiasis      (uric acid/calcium)   • Pulmonary embolism (CMS/HCC) 2011   • Pulmonary embolism (CMS/HCC) 2002    DVT   • Pulmonary embolism (CMS/HCC) 2010    DVT    • Torn meniscus 2002    left    • Torn meniscus 2008    left       Past Surgical History:   Procedure Laterality Date   • ACHILLES TENDON REPAIR  2010   • FL CYSTO URETHROSCOPY W REM FB     • KNEE ARTHROSCOPY W/ MENISCAL REPAIR Left 2008   • KNEE ARTHROSCOPY W/ MENISCAL REPAIR Left 2002   • TONSILLECTOMY     • VASECTOMY  1994       Family History   Problem Relation Age of Onset   • Heart attack Mother 73   • Heart attack Father 28        and 62   • Coronary artery disease Brother 43        premature    • Heart attack Paternal Uncle 61       Social History     Socioeconomic History   • Marital status:      Spouse name: Not on file   • Number of children: Not on file   • Years of education: Not on file   • Highest education level: Not on file    Tobacco Use   • Smoking status: Never Smoker       Allergies   Allergen Reactions   • Penicillins Unknown (See Comments)     Unknown         Current Outpatient Medications:   •  allopurinol (ZYLOPRIM) 300 MG tablet, TAKE 1 TABLET DAILY, Disp: 90 tablet, Rfl: 1  •  atorvastatin (LIPITOR) 40 MG tablet, TAKE 1 TABLET DAILY, Disp: 90 tablet, Rfl: 1  •  Cetirizine HCl (ZYRTEC ALLERGY) 10 MG capsule, Take 1 capsule by mouth Daily., Disp: , Rfl:   •  famotidine (PEPCID) 20 MG tablet, Take 1 tablet by mouth As Needed., Disp: , Rfl:   •  guaifenesin-dextromethorphan (MUCINEX DM)  MG tablet sustained-release 12 hour tablet, Take 1 tablet by mouth 2 (Two) Times a Day As Needed., Disp: , Rfl:   •  lisinopril (PRINIVIL,ZESTRIL) 5 MG tablet, TAKE 1 TABLET DAILY, Disp: 90 tablet, Rfl: 1  •  Testosterone 30 MG/ACT solution, APPLY 1 PUMP TO EACH UNDERARM EVERY MORNING, Disp: 270 mL, Rfl: 0  •  warfarin (COUMADIN) 5 MG tablet, TAKE 1 TABLET BY MOUTH EVERY OTHER DAY ALTERNATING WITH 1 1/2 TABLETS EVERY OTHER DAY, Disp: 45 tablet, Rfl: 0  •  neomycin-polymyxin-dexamethasone (MAXITROL) 0.1 % ophthalmic suspension, Administer 1 drop into the left eye 4 (Four) Times a Day., Disp: 5 mL, Rfl: 0    Review of Systems   Constitutional: Negative for chills, fatigue and fever.   HENT: Negative for congestion, ear pain and sinus pressure.    Eyes: Positive for redness.   Respiratory: Negative for cough, chest tightness, shortness of breath and wheezing.    Cardiovascular: Negative for chest pain and palpitations.   Gastrointestinal: Negative for abdominal pain, blood in stool and constipation.   Skin: Negative for color change.   Allergic/Immunologic: Negative for environmental allergies.   Neurological: Negative for dizziness, speech difficulty and headache.   Psychiatric/Behavioral: Negative for decreased concentration. The patient is not nervous/anxious.         Vital Signs  Vitals:    11/08/19 0940   BP: 118/80   BP Location: Left arm  "  Patient Position: Sitting   Cuff Size: Adult   Pulse: 60   Weight: 117 kg (257 lb)   Height: 190.5 cm (75\")   PainSc:   3   PainLoc: Eye  Comment: left       Physical Exam   Constitutional: He is oriented to person, place, and time. He appears well-developed and well-nourished.   HENT:   Head: Normocephalic and atraumatic.   Eyes: Conjunctivae are normal.   The left upper and lower lids are slightly swollen with mild erythema.   Cardiovascular: Normal rate, regular rhythm and normal heart sounds.   No murmur heard.  Pulmonary/Chest: Effort normal and breath sounds normal.   Neurological: He is alert and oriented to person, place, and time.   Psychiatric: He has a normal mood and affect.   Vitals reviewed.     Procedures    ACE III MINI             Assessment/Plan:    Joseph was seen today for eye problem.    Diagnoses and all orders for this visit:    Postphlebitic syndrome  -     POC Protime / INR    Hypercoagulable state (CMS/HCC)  -     POC Protime / INR    Blepharitis of both upper and lower eyelid of left eye, unspecified type    Other orders  -     Flucelvax Quad=>4Years (PFS)  -     neomycin-polymyxin-dexamethasone (MAXITROL) 0.1 % ophthalmic suspension; Administer 1 drop into the left eye 4 (Four) Times a Day.    Plan     Treatment is hot compresses and antibiotic/steroid eyedrops.  Expect this to resolve fairly quickly with treatment.  If not, will refer to ophthalmology.      Plan of care reviewed with patient at the conclusion of today's visit. Education was provided regarding diagnosis, management, and any prescribed or recommended OTC medications.Patient verbalizes understanding of and agreement with management plan.         Jairo Carter MD           "

## 2019-11-26 ENCOUNTER — TELEPHONE (OUTPATIENT)
Dept: INTERNAL MEDICINE | Facility: CLINIC | Age: 64
End: 2019-11-26

## 2019-11-26 DIAGNOSIS — H57.12 DISCOMFORT OF LEFT EYE: Primary | ICD-10-CM

## 2019-11-26 NOTE — TELEPHONE ENCOUNTER
Pt called and stated that he is still having some swelling around the eye and is requesting a referral for opthalmology    Pt call back [phone]

## 2019-11-27 RX ORDER — WARFARIN SODIUM 5 MG/1
TABLET ORAL
Qty: 45 TABLET | Refills: 0 | Status: SHIPPED | OUTPATIENT
Start: 2019-11-27 | End: 2020-01-09 | Stop reason: SDUPTHER

## 2019-12-30 RX ORDER — ATORVASTATIN CALCIUM 40 MG/1
TABLET, FILM COATED ORAL
Qty: 90 TABLET | Refills: 1 | Status: SHIPPED | OUTPATIENT
Start: 2019-12-30 | End: 2020-06-26

## 2019-12-30 RX ORDER — ALLOPURINOL 300 MG/1
TABLET ORAL
Qty: 90 TABLET | Refills: 1 | Status: SHIPPED | OUTPATIENT
Start: 2019-12-30 | End: 2020-06-26

## 2020-01-09 ENCOUNTER — TELEPHONE (OUTPATIENT)
Dept: INTERNAL MEDICINE | Facility: CLINIC | Age: 65
End: 2020-01-09

## 2020-01-09 ENCOUNTER — OFFICE VISIT (OUTPATIENT)
Dept: INTERNAL MEDICINE | Facility: CLINIC | Age: 65
End: 2020-01-09

## 2020-01-09 ENCOUNTER — APPOINTMENT (OUTPATIENT)
Dept: LAB | Facility: HOSPITAL | Age: 65
End: 2020-01-09

## 2020-01-09 VITALS
DIASTOLIC BLOOD PRESSURE: 84 MMHG | HEIGHT: 75 IN | WEIGHT: 261.5 LBS | BODY MASS INDEX: 32.51 KG/M2 | SYSTOLIC BLOOD PRESSURE: 122 MMHG | HEART RATE: 60 BPM

## 2020-01-09 DIAGNOSIS — E29.1 MALE HYPOGONADISM: ICD-10-CM

## 2020-01-09 DIAGNOSIS — I10 BENIGN ESSENTIAL HYPERTENSION: Primary | ICD-10-CM

## 2020-01-09 DIAGNOSIS — E78.2 MIXED HYPERLIPIDEMIA: ICD-10-CM

## 2020-01-09 DIAGNOSIS — D68.59 HYPERCOAGULABLE STATE (HCC): ICD-10-CM

## 2020-01-09 DIAGNOSIS — R53.83 OTHER FATIGUE: ICD-10-CM

## 2020-01-09 LAB
INR PPP: 2.1 (ref 0.9–1.1)
PROTHROMBIN TIME: 25.5 SECONDS

## 2020-01-09 PROCEDURE — 99214 OFFICE O/P EST MOD 30 MIN: CPT | Performed by: INTERNAL MEDICINE

## 2020-01-09 PROCEDURE — 85610 PROTHROMBIN TIME: CPT | Performed by: INTERNAL MEDICINE

## 2020-01-09 RX ORDER — WARFARIN SODIUM 5 MG/1
TABLET ORAL
Qty: 45 TABLET | Refills: 0 | Status: SHIPPED | OUTPATIENT
Start: 2020-01-09 | End: 2020-02-21

## 2020-01-09 NOTE — PROGRESS NOTES
[facility]     Joseph Lundberg  1955   [id number]      Patient Care Team:  Jairo Carter MD as PCP - General (Internal Medicine)    Chief Complaint::   Chief Complaint   Patient presents with   • Hyperlipidemia   • Hypertension        HPI  Mr. Lundberg comes in for follow-up of his hypertension, hyperlipidemia, hypercoagulable state, and male hypergonadism.  Overall he feels well.  There is no shortness of breath or chest discomfort.  His only complaint is fatigue.  He admits that he is not been very active over the last few months.  He is taking his medications including his testosterone as prescribed.    Chronic Conditions:      Patient Active Problem List   Diagnosis   • AR (allergic rhinitis)   • Benign essential hypertension   • Dyspnea on exertion   • Foot pain   • GERD (gastroesophageal reflux disease)   • Hypercoagulable state (CMS/HCC)   • HLD (hyperlipidemia)   • Male hypogonadism   • Mild persistent asthma   • Postphlebitic syndrome   • Pulmonary embolism (CMS/HCC)        Past Medical History:   Diagnosis Date   • Achilles tendon injury    • Nephrolithiasis      (uric acid/calcium)   • Pulmonary embolism (CMS/HCC) 2011   • Pulmonary embolism (CMS/HCC) 2002    DVT   • Pulmonary embolism (CMS/HCC) 2010    DVT    • Torn meniscus 2002    left    • Torn meniscus 2008    left       Past Surgical History:   Procedure Laterality Date   • ACHILLES TENDON REPAIR  2010   • FL CYSTO URETHROSCOPY W REM FB     • KNEE ARTHROSCOPY W/ MENISCAL REPAIR Left 2008   • KNEE ARTHROSCOPY W/ MENISCAL REPAIR Left 2002   • TONSILLECTOMY     • VASECTOMY  1994       Family History   Problem Relation Age of Onset   • Heart attack Mother 73   • Heart disease Mother    • Heart attack Father 28        and 62   • Heart disease Father    • Coronary artery disease Brother 43        premature    • Heart disease Brother    • Heart attack Paternal Uncle 61   • Heart disease Paternal Uncle    • Kidney disease Paternal  Uncle    • Stroke Maternal Grandmother        Social History     Socioeconomic History   • Marital status:      Spouse name: Not on file   • Number of children: Not on file   • Years of education: Not on file   • Highest education level: Not on file   Tobacco Use   • Smoking status: Never Smoker   Substance and Sexual Activity   • Alcohol use: Yes     Alcohol/week: 1.0 standard drinks     Types: 1 Cans of beer per week   • Drug use: Never   • Sexual activity: Never       Allergies   Allergen Reactions   • Penicillins Unknown (See Comments)     Unknown         Current Outpatient Medications:   •  allopurinol (ZYLOPRIM) 300 MG tablet, TAKE 1 TABLET DAILY, Disp: 90 tablet, Rfl: 1  •  atorvastatin (LIPITOR) 40 MG tablet, TAKE 1 TABLET DAILY, Disp: 90 tablet, Rfl: 1  •  Cetirizine HCl (ZYRTEC ALLERGY) 10 MG capsule, Take 1 capsule by mouth Daily As Needed., Disp: , Rfl:   •  famotidine (PEPCID) 20 MG tablet, Take 1 tablet by mouth As Needed., Disp: , Rfl:   •  lisinopril (PRINIVIL,ZESTRIL) 5 MG tablet, TAKE 1 TABLET DAILY, Disp: 90 tablet, Rfl: 1  •  Testosterone 30 MG/ACT solution, APPLY 1 PUMP TO EACH UNDERARM EVERY MORNING, Disp: 270 mL, Rfl: 0  •  warfarin (COUMADIN) 5 MG tablet, Take 5mg daily alternating with 7.5mg every other day, Disp: 45 tablet, Rfl: 0    Review of Systems   Constitutional: Negative for chills, fatigue and fever.   HENT: Negative for congestion, ear pain and sinus pressure.    Respiratory: Negative for cough, chest tightness, shortness of breath and wheezing.    Cardiovascular: Negative for chest pain and palpitations.   Gastrointestinal: Negative for abdominal pain, blood in stool and constipation.   Skin: Negative for color change.   Allergic/Immunologic: Negative for environmental allergies.   Neurological: Negative for dizziness, speech difficulty and headache.   Psychiatric/Behavioral: Negative for decreased concentration. The patient is not nervous/anxious.         Vital  "Signs  Vitals:    01/09/20 1052   BP: 122/84   BP Location: Left arm   Patient Position: Sitting   Cuff Size: Adult   Pulse: 60   Weight: 119 kg (261 lb 8 oz)   Height: 190.5 cm (75\")   PainSc:   6   PainLoc: Arm  Comment: right       Physical Exam   Constitutional: He is oriented to person, place, and time. He appears well-developed and well-nourished. He appears overweight.   HENT:   Head: Normocephalic and atraumatic.   Cardiovascular: Normal rate, regular rhythm and normal heart sounds.   No murmur heard.  Pulmonary/Chest: Effort normal and breath sounds normal.   Neurological: He is alert and oriented to person, place, and time.   Psychiatric: He has a normal mood and affect.   Vitals reviewed.     Procedures    ACE III MINI             Assessment/Plan:    Joseph was seen today for hyperlipidemia and hypertension.    Diagnoses and all orders for this visit:    Benign essential hypertension  -     CBC & Differential; Future  -     Comprehensive Metabolic Panel; Future    Mixed hyperlipidemia  -     Lipid Panel; Future    Hypercoagulable state (CMS/HCC)  -     POC Protime / INR    Male hypogonadism  -     Testosterone; Future    Other fatigue  -     Vitamin B12; Future  -     TSH; Future    Other orders  -     warfarin (COUMADIN) 5 MG tablet; Take 5mg daily alternating with 7.5mg every other day    Plan  Blood pressure is well controlled on lisinopril.      Lipid panel is pending, continue atorvastatin and suggested he work on improving his diet.    He remains on lifelong warfarin for hypercoagulable state.  INR is therapeutic today.    Testosterone level is pending, continue topical testosterone.    Fatigue is most likely due to deconditioning but will obtain metabolic survey to rule out underlying vitamin deficiency.  The treatment ultimately is to improve physical fitness and lose weight.      Plan of care reviewed with patient at the conclusion of today's visit. Education was provided regarding diagnosis, " management, and any prescribed or recommended OTC medications.Patient verbalizes understanding of and agreement with management plan.         Jairo Carter MD           Answers for HPI/ROS submitted by the patient on 1/7/2020   How long have you been having these symptoms?: Other

## 2020-01-10 ENCOUNTER — LAB (OUTPATIENT)
Dept: LAB | Facility: HOSPITAL | Age: 65
End: 2020-01-10

## 2020-01-10 DIAGNOSIS — R53.83 OTHER FATIGUE: ICD-10-CM

## 2020-01-10 DIAGNOSIS — I10 BENIGN ESSENTIAL HYPERTENSION: ICD-10-CM

## 2020-01-10 DIAGNOSIS — E29.1 MALE HYPOGONADISM: ICD-10-CM

## 2020-01-10 DIAGNOSIS — E78.2 MIXED HYPERLIPIDEMIA: ICD-10-CM

## 2020-01-10 LAB
ALBUMIN SERPL-MCNC: 4.6 G/DL (ref 3.5–5.2)
ALBUMIN/GLOB SERPL: 1.4 G/DL
ALP SERPL-CCNC: 101 U/L (ref 39–117)
ALT SERPL W P-5'-P-CCNC: 23 U/L (ref 1–41)
ANION GAP SERPL CALCULATED.3IONS-SCNC: 12 MMOL/L (ref 5–15)
AST SERPL-CCNC: 16 U/L (ref 1–40)
BASOPHILS # BLD AUTO: 0.04 10*3/MM3 (ref 0–0.2)
BASOPHILS NFR BLD AUTO: 0.7 % (ref 0–1.5)
BILIRUB SERPL-MCNC: 0.8 MG/DL (ref 0.2–1.2)
BUN BLD-MCNC: 17 MG/DL (ref 8–23)
BUN/CREAT SERPL: 14.3 (ref 7–25)
CALCIUM SPEC-SCNC: 9.7 MG/DL (ref 8.6–10.5)
CHLORIDE SERPL-SCNC: 101 MMOL/L (ref 98–107)
CHOLEST SERPL-MCNC: 151 MG/DL (ref 0–200)
CO2 SERPL-SCNC: 26 MMOL/L (ref 22–29)
CREAT BLD-MCNC: 1.19 MG/DL (ref 0.76–1.27)
DEPRECATED RDW RBC AUTO: 46.5 FL (ref 37–54)
EOSINOPHIL # BLD AUTO: 0.07 10*3/MM3 (ref 0–0.4)
EOSINOPHIL NFR BLD AUTO: 1.3 % (ref 0.3–6.2)
ERYTHROCYTE [DISTWIDTH] IN BLOOD BY AUTOMATED COUNT: 14.3 % (ref 12.3–15.4)
GFR SERPL CREATININE-BSD FRML MDRD: 62 ML/MIN/1.73
GLOBULIN UR ELPH-MCNC: 3.2 GM/DL
GLUCOSE BLD-MCNC: 98 MG/DL (ref 65–99)
HCT VFR BLD AUTO: 45.6 % (ref 37.5–51)
HDLC SERPL-MCNC: 33 MG/DL (ref 40–60)
HGB BLD-MCNC: 15.4 G/DL (ref 13–17.7)
IMM GRANULOCYTES # BLD AUTO: 0.02 10*3/MM3 (ref 0–0.05)
IMM GRANULOCYTES NFR BLD AUTO: 0.4 % (ref 0–0.5)
LDLC SERPL CALC-MCNC: 99 MG/DL (ref 0–100)
LDLC/HDLC SERPL: 2.99 {RATIO}
LYMPHOCYTES # BLD AUTO: 1.32 10*3/MM3 (ref 0.7–3.1)
LYMPHOCYTES NFR BLD AUTO: 24.3 % (ref 19.6–45.3)
MCH RBC QN AUTO: 30.6 PG (ref 26.6–33)
MCHC RBC AUTO-ENTMCNC: 33.8 G/DL (ref 31.5–35.7)
MCV RBC AUTO: 90.7 FL (ref 79–97)
MONOCYTES # BLD AUTO: 0.35 10*3/MM3 (ref 0.1–0.9)
MONOCYTES NFR BLD AUTO: 6.4 % (ref 5–12)
NEUTROPHILS # BLD AUTO: 3.63 10*3/MM3 (ref 1.7–7)
NEUTROPHILS NFR BLD AUTO: 66.9 % (ref 42.7–76)
NRBC BLD AUTO-RTO: 0 /100 WBC (ref 0–0.2)
PLATELET # BLD AUTO: 173 10*3/MM3 (ref 140–450)
PMV BLD AUTO: 10.7 FL (ref 6–12)
POTASSIUM BLD-SCNC: 5.1 MMOL/L (ref 3.5–5.2)
PROT SERPL-MCNC: 7.8 G/DL (ref 6–8.5)
RBC # BLD AUTO: 5.03 10*6/MM3 (ref 4.14–5.8)
SODIUM BLD-SCNC: 139 MMOL/L (ref 136–145)
TESTOST SERPL-MCNC: 26.8 NG/DL (ref 193–740)
TRIGL SERPL-MCNC: 97 MG/DL (ref 0–150)
TSH SERPL DL<=0.05 MIU/L-ACNC: 2.26 UIU/ML (ref 0.27–4.2)
VIT B12 BLD-MCNC: 629 PG/ML (ref 211–946)
VLDLC SERPL-MCNC: 19.4 MG/DL (ref 5–40)
WBC NRBC COR # BLD: 5.43 10*3/MM3 (ref 3.4–10.8)

## 2020-01-10 PROCEDURE — 84403 ASSAY OF TOTAL TESTOSTERONE: CPT

## 2020-01-10 PROCEDURE — 80053 COMPREHEN METABOLIC PANEL: CPT

## 2020-01-10 PROCEDURE — 84443 ASSAY THYROID STIM HORMONE: CPT

## 2020-01-10 PROCEDURE — 82607 VITAMIN B-12: CPT

## 2020-01-10 PROCEDURE — 80061 LIPID PANEL: CPT

## 2020-01-10 PROCEDURE — 85025 COMPLETE CBC W/AUTO DIFF WBC: CPT

## 2020-01-15 DIAGNOSIS — E29.1 MALE HYPOGONADISM: Primary | ICD-10-CM

## 2020-01-15 RX ORDER — TESTOSTERONE 30 MG/1.5ML
SOLUTION TOPICAL
Qty: 270 ML | Refills: 0 | Status: SHIPPED | OUTPATIENT
Start: 2020-01-15 | End: 2020-05-06

## 2020-02-21 RX ORDER — WARFARIN SODIUM 5 MG/1
TABLET ORAL
Qty: 45 TABLET | Refills: 0 | Status: SHIPPED | OUTPATIENT
Start: 2020-02-21 | End: 2020-03-13 | Stop reason: SDUPTHER

## 2020-03-12 RX ORDER — LISINOPRIL 5 MG/1
TABLET ORAL
Qty: 90 TABLET | Refills: 1 | Status: SHIPPED | OUTPATIENT
Start: 2020-03-12 | End: 2020-09-01 | Stop reason: SDUPTHER

## 2020-03-13 ENCOUNTER — TELEPHONE (OUTPATIENT)
Dept: INTERNAL MEDICINE | Facility: CLINIC | Age: 65
End: 2020-03-13

## 2020-03-13 RX ORDER — WARFARIN SODIUM 5 MG/1
TABLET ORAL
Qty: 45 TABLET | Refills: 0 | Status: SHIPPED | OUTPATIENT
Start: 2020-03-13 | End: 2020-05-05

## 2020-03-17 RX ORDER — WARFARIN SODIUM 5 MG/1
TABLET ORAL
Qty: 45 TABLET | Refills: 0 | OUTPATIENT
Start: 2020-03-17

## 2020-05-05 DIAGNOSIS — E29.1 MALE HYPOGONADISM: ICD-10-CM

## 2020-05-05 RX ORDER — WARFARIN SODIUM 5 MG/1
TABLET ORAL
Qty: 45 TABLET | Refills: 0 | Status: SHIPPED | OUTPATIENT
Start: 2020-05-05 | End: 2020-06-01

## 2020-05-05 NOTE — TELEPHONE ENCOUNTER
Last Seen:  01/09/2020    Follow Up:   07/14/2020    Last approved:  01/15/2020          Refills:  0    Alis: Completed --printed for your review    NO PREVIOUS ALIS OR CONTROLLED SUBSTANCE AGREEMENT ON FILE

## 2020-05-06 RX ORDER — TESTOSTERONE 30 MG/1.5ML
SOLUTION TOPICAL
Qty: 270 ML | Refills: 0 | Status: SHIPPED | OUTPATIENT
Start: 2020-05-06 | End: 2020-10-19

## 2020-06-01 RX ORDER — WARFARIN SODIUM 5 MG/1
TABLET ORAL
Qty: 45 TABLET | Refills: 0 | Status: SHIPPED | OUTPATIENT
Start: 2020-06-01 | End: 2020-06-26

## 2020-06-26 RX ORDER — WARFARIN SODIUM 5 MG/1
TABLET ORAL
Qty: 45 TABLET | Refills: 0 | Status: SHIPPED | OUTPATIENT
Start: 2020-06-26 | End: 2020-07-21

## 2020-06-26 RX ORDER — ALLOPURINOL 300 MG/1
TABLET ORAL
Qty: 90 TABLET | Refills: 1 | Status: SHIPPED | OUTPATIENT
Start: 2020-06-26 | End: 2020-12-15 | Stop reason: SDUPTHER

## 2020-06-26 RX ORDER — ATORVASTATIN CALCIUM 40 MG/1
TABLET, FILM COATED ORAL
Qty: 90 TABLET | Refills: 1 | Status: SHIPPED | OUTPATIENT
Start: 2020-06-26 | End: 2020-11-30

## 2020-07-08 ENCOUNTER — CLINICAL SUPPORT (OUTPATIENT)
Dept: INTERNAL MEDICINE | Facility: CLINIC | Age: 65
End: 2020-07-08

## 2020-07-08 DIAGNOSIS — D68.59 HYPERCOAGULABLE STATE (HCC): Primary | ICD-10-CM

## 2020-07-08 LAB
INR PPP: 3.1 (ref 0.9–1.1)
PROTHROMBIN TIME: 37.7 SECONDS

## 2020-07-08 PROCEDURE — 85610 PROTHROMBIN TIME: CPT | Performed by: INTERNAL MEDICINE

## 2020-07-08 PROCEDURE — 36416 COLLJ CAPILLARY BLOOD SPEC: CPT | Performed by: INTERNAL MEDICINE

## 2020-07-21 RX ORDER — WARFARIN SODIUM 5 MG/1
TABLET ORAL
Qty: 45 TABLET | Refills: 0 | Status: SHIPPED | OUTPATIENT
Start: 2020-07-21 | End: 2020-08-17

## 2020-08-17 RX ORDER — WARFARIN SODIUM 5 MG/1
TABLET ORAL
Qty: 45 TABLET | Refills: 0 | Status: SHIPPED | OUTPATIENT
Start: 2020-08-17 | End: 2020-10-07

## 2020-08-17 NOTE — TELEPHONE ENCOUNTER
Last checked 07/08/20 INR 3.1. Patient was told to return in 1 month and to continue alternating 7.5mg and 5mg. Please advise on refill.

## 2020-08-18 ENCOUNTER — TELEPHONE (OUTPATIENT)
Dept: INTERNAL MEDICINE | Facility: CLINIC | Age: 65
End: 2020-08-18

## 2020-08-18 NOTE — TELEPHONE ENCOUNTER
Called patient and informed he is overdue for his PT INR.  He states he was going to get it checked at his appt in a few weeks.  I explained he is 2 weeks overdue and we really need him to come in to be checked.  Patient verbalized understanding and said he will com in this week.

## 2020-08-19 ENCOUNTER — TELEPHONE (OUTPATIENT)
Dept: INTERNAL MEDICINE | Facility: CLINIC | Age: 65
End: 2020-08-19

## 2020-08-19 ENCOUNTER — CLINICAL SUPPORT (OUTPATIENT)
Dept: INTERNAL MEDICINE | Facility: CLINIC | Age: 65
End: 2020-08-19

## 2020-08-19 DIAGNOSIS — D68.59 HYPERCOAGULABLE STATE (HCC): Primary | ICD-10-CM

## 2020-08-19 LAB
INR PPP: 2.9 (ref 0.9–1.1)
PROTHROMBIN TIME: 35.4 SECONDS

## 2020-08-19 PROCEDURE — 85610 PROTHROMBIN TIME: CPT | Performed by: INTERNAL MEDICINE

## 2020-08-19 PROCEDURE — 36416 COLLJ CAPILLARY BLOOD SPEC: CPT | Performed by: INTERNAL MEDICINE

## 2020-08-19 PROCEDURE — 93793 ANTICOAG MGMT PT WARFARIN: CPT | Performed by: INTERNAL MEDICINE

## 2020-08-19 NOTE — TELEPHONE ENCOUNTER
----- Message from Mulugeta Higginbotham MD sent at 8/19/2020  2:07 PM EDT -----  INR is 2.9, therapeutic, no change warfarin therapy repeat in 1 month please inform patient

## 2020-09-01 ENCOUNTER — OFFICE VISIT (OUTPATIENT)
Dept: INTERNAL MEDICINE | Facility: CLINIC | Age: 65
End: 2020-09-01

## 2020-09-01 ENCOUNTER — LAB (OUTPATIENT)
Dept: LAB | Facility: HOSPITAL | Age: 65
End: 2020-09-01

## 2020-09-01 VITALS
DIASTOLIC BLOOD PRESSURE: 80 MMHG | HEIGHT: 75 IN | TEMPERATURE: 96.5 F | WEIGHT: 259.6 LBS | SYSTOLIC BLOOD PRESSURE: 124 MMHG | HEART RATE: 60 BPM | BODY MASS INDEX: 32.28 KG/M2

## 2020-09-01 DIAGNOSIS — E29.1 MALE HYPOGONADISM: ICD-10-CM

## 2020-09-01 DIAGNOSIS — Z12.5 PROSTATE CANCER SCREENING: ICD-10-CM

## 2020-09-01 DIAGNOSIS — I10 BENIGN ESSENTIAL HYPERTENSION: ICD-10-CM

## 2020-09-01 DIAGNOSIS — D68.59 HYPERCOAGULABLE STATE (HCC): ICD-10-CM

## 2020-09-01 DIAGNOSIS — I87.009 POSTPHLEBITIC SYNDROME: ICD-10-CM

## 2020-09-01 DIAGNOSIS — Z00.00 PREVENTATIVE HEALTH CARE: Primary | ICD-10-CM

## 2020-09-01 DIAGNOSIS — E78.2 MIXED HYPERLIPIDEMIA: ICD-10-CM

## 2020-09-01 DIAGNOSIS — K21.9 GASTROESOPHAGEAL REFLUX DISEASE WITHOUT ESOPHAGITIS: ICD-10-CM

## 2020-09-01 LAB
ALBUMIN SERPL-MCNC: 4.5 G/DL (ref 3.5–5.2)
ALBUMIN/GLOB SERPL: 1.5 G/DL
ALP SERPL-CCNC: 90 U/L (ref 39–117)
ALT SERPL W P-5'-P-CCNC: 17 U/L (ref 1–41)
ANION GAP SERPL CALCULATED.3IONS-SCNC: 9.5 MMOL/L (ref 5–15)
AST SERPL-CCNC: 19 U/L (ref 1–40)
BASOPHILS # BLD AUTO: 0.06 10*3/MM3 (ref 0–0.2)
BASOPHILS NFR BLD AUTO: 1.3 % (ref 0–1.5)
BILIRUB SERPL-MCNC: 0.9 MG/DL (ref 0–1.2)
BUN SERPL-MCNC: 14 MG/DL (ref 8–23)
BUN/CREAT SERPL: 9.7 (ref 7–25)
CALCIUM SPEC-SCNC: 9.6 MG/DL (ref 8.6–10.5)
CHLORIDE SERPL-SCNC: 104 MMOL/L (ref 98–107)
CHOLEST SERPL-MCNC: 144 MG/DL (ref 0–200)
CO2 SERPL-SCNC: 26.5 MMOL/L (ref 22–29)
CREAT SERPL-MCNC: 1.45 MG/DL (ref 0.76–1.27)
DEPRECATED RDW RBC AUTO: 46.9 FL (ref 37–54)
EOSINOPHIL # BLD AUTO: 0.11 10*3/MM3 (ref 0–0.4)
EOSINOPHIL NFR BLD AUTO: 2.3 % (ref 0.3–6.2)
ERYTHROCYTE [DISTWIDTH] IN BLOOD BY AUTOMATED COUNT: 14.4 % (ref 12.3–15.4)
GFR SERPL CREATININE-BSD FRML MDRD: 49 ML/MIN/1.73
GLOBULIN UR ELPH-MCNC: 3 GM/DL
GLUCOSE SERPL-MCNC: 94 MG/DL (ref 65–99)
HCT VFR BLD AUTO: 46.4 % (ref 37.5–51)
HDLC SERPL-MCNC: 29 MG/DL (ref 40–60)
HGB BLD-MCNC: 15.5 G/DL (ref 13–17.7)
IMM GRANULOCYTES # BLD AUTO: 0.01 10*3/MM3 (ref 0–0.05)
IMM GRANULOCYTES NFR BLD AUTO: 0.2 % (ref 0–0.5)
LDLC SERPL CALC-MCNC: 95 MG/DL (ref 0–100)
LDLC/HDLC SERPL: 3.28 {RATIO}
LYMPHOCYTES # BLD AUTO: 1.05 10*3/MM3 (ref 0.7–3.1)
LYMPHOCYTES NFR BLD AUTO: 22.3 % (ref 19.6–45.3)
MCH RBC QN AUTO: 29.9 PG (ref 26.6–33)
MCHC RBC AUTO-ENTMCNC: 33.4 G/DL (ref 31.5–35.7)
MCV RBC AUTO: 89.6 FL (ref 79–97)
MONOCYTES # BLD AUTO: 0.4 10*3/MM3 (ref 0.1–0.9)
MONOCYTES NFR BLD AUTO: 8.5 % (ref 5–12)
NEUTROPHILS NFR BLD AUTO: 3.08 10*3/MM3 (ref 1.7–7)
NEUTROPHILS NFR BLD AUTO: 65.4 % (ref 42.7–76)
NRBC BLD AUTO-RTO: 0 /100 WBC (ref 0–0.2)
PLATELET # BLD AUTO: 148 10*3/MM3 (ref 140–450)
PMV BLD AUTO: 10.8 FL (ref 6–12)
POTASSIUM SERPL-SCNC: 4 MMOL/L (ref 3.5–5.2)
PROT SERPL-MCNC: 7.5 G/DL (ref 6–8.5)
PSA SERPL-MCNC: 1.76 NG/ML (ref 0–4)
RBC # BLD AUTO: 5.18 10*6/MM3 (ref 4.14–5.8)
SODIUM SERPL-SCNC: 140 MMOL/L (ref 136–145)
TESTOST SERPL-MCNC: 231 NG/DL (ref 193–740)
TRIGL SERPL-MCNC: 100 MG/DL (ref 0–150)
VLDLC SERPL-MCNC: 20 MG/DL (ref 5–40)
WBC # BLD AUTO: 4.71 10*3/MM3 (ref 3.4–10.8)

## 2020-09-01 PROCEDURE — 84403 ASSAY OF TOTAL TESTOSTERONE: CPT

## 2020-09-01 PROCEDURE — 82043 UR ALBUMIN QUANTITATIVE: CPT

## 2020-09-01 PROCEDURE — 85025 COMPLETE CBC W/AUTO DIFF WBC: CPT

## 2020-09-01 PROCEDURE — 80053 COMPREHEN METABOLIC PANEL: CPT

## 2020-09-01 PROCEDURE — G0103 PSA SCREENING: HCPCS

## 2020-09-01 PROCEDURE — 82570 ASSAY OF URINE CREATININE: CPT

## 2020-09-01 PROCEDURE — 80061 LIPID PANEL: CPT

## 2020-09-01 PROCEDURE — 99396 PREV VISIT EST AGE 40-64: CPT | Performed by: INTERNAL MEDICINE

## 2020-09-01 RX ORDER — SILDENAFIL 100 MG/1
100 TABLET, FILM COATED ORAL DAILY PRN
Qty: 10 TABLET | Refills: 5 | Status: SHIPPED | OUTPATIENT
Start: 2020-09-01

## 2020-09-01 RX ORDER — LISINOPRIL 5 MG/1
5 TABLET ORAL DAILY
Qty: 90 TABLET | Refills: 1 | Status: SHIPPED | OUTPATIENT
Start: 2020-09-01 | End: 2021-02-24

## 2020-09-01 NOTE — PROGRESS NOTES
[facility]     Joseph Lundberg  1955   [id number]      Patient Care Team:  Jairo Carter MD as PCP - General (Internal Medicine)    Chief Complaint::   Chief Complaint   Patient presents with   • Annual Exam        HPI  Mr. Lundberg is now 64.  He comes in for follow-up of his hypertension, hyperlipidemia, hypergonadism, hypercoagulable state and for annual examination.  Overall he is doing well.  He wears compression daily.  This helps control his edema and makes his legs feel better.  He does complain of erectile dysfunction.  He continues to take testosterone, but states that his libido is normal.  He has lost a small amount of weight.  There is no fever, cough, shortness of breath or chest pain.    Chronic Conditions:      Patient Active Problem List   Diagnosis   • AR (allergic rhinitis)   • Benign essential hypertension   • Dyspnea on exertion   • Foot pain   • GERD (gastroesophageal reflux disease)   • Hypercoagulable state (CMS/HCC)   • HLD (hyperlipidemia)   • Male hypogonadism   • Mild persistent asthma   • Postphlebitic syndrome   • Pulmonary embolism (CMS/HCC)        Past Medical History:   Diagnosis Date   • Achilles tendon injury    • Nephrolithiasis      (uric acid/calcium)   • Pulmonary embolism (CMS/HCC) 2011   • Pulmonary embolism (CMS/HCC) 2002    DVT   • Pulmonary embolism (CMS/HCC) 2010    DVT    • Torn meniscus 2002    left    • Torn meniscus 2008    left       Past Surgical History:   Procedure Laterality Date   • ACHILLES TENDON REPAIR  2010   • FL CYSTO URETHROSCOPY W REM FB     • KNEE ARTHROSCOPY W/ MENISCAL REPAIR Left 2008   • KNEE ARTHROSCOPY W/ MENISCAL REPAIR Left 2002   • TONSILLECTOMY     • VASECTOMY  1994       Family History   Problem Relation Age of Onset   • Heart attack Mother 73   • Heart disease Mother    • Heart attack Father 28        and 62   • Heart disease Father    • Coronary artery disease Brother 43        premature    • Heart disease Brother     • Heart attack Paternal Uncle 61   • Heart disease Paternal Uncle    • Kidney disease Paternal Uncle    • Stroke Maternal Grandmother        Social History     Socioeconomic History   • Marital status:      Spouse name: Not on file   • Number of children: Not on file   • Years of education: Not on file   • Highest education level: Not on file   Tobacco Use   • Smoking status: Never Smoker   • Smokeless tobacco: Never Used   Substance and Sexual Activity   • Alcohol use: Yes     Alcohol/week: 1.0 standard drinks     Types: 1 Cans of beer per week   • Drug use: Never   • Sexual activity: Never       Allergies   Allergen Reactions   • Penicillins Unknown (See Comments)     Unknown         Current Outpatient Medications:   •  allopurinol (ZYLOPRIM) 300 MG tablet, TAKE 1 TABLET DAILY, Disp: 90 tablet, Rfl: 1  •  atorvastatin (LIPITOR) 40 MG tablet, TAKE 1 TABLET DAILY, Disp: 90 tablet, Rfl: 1  •  Cetirizine HCl (ZYRTEC ALLERGY) 10 MG capsule, Take 1 capsule by mouth Daily As Needed., Disp: , Rfl:   •  famotidine (PEPCID) 20 MG tablet, Take 1 tablet by mouth As Needed., Disp: , Rfl:   •  lisinopril (PRINIVIL,ZESTRIL) 5 MG tablet, Take 1 tablet by mouth Daily., Disp: 90 tablet, Rfl: 1  •  Testosterone 30 MG/ACT solution, APPLY ONE ACTUATION TO EACH UNDERARM DAILY, Disp: 270 mL, Rfl: 0  •  warfarin (Jantoven) 5 MG tablet, TAKE 1 TABLET EVERY OTHER  DAY ALTERNATING WITH 1 AND 1/2 TABLETS EVERY OTHER    DAY, Disp: 45 tablet, Rfl: 0  •  sildenafil (Viagra) 100 MG tablet, Take 1 tablet by mouth Daily As Needed for Erectile Dysfunction., Disp: 10 tablet, Rfl: 5    Immunization History   Administered Date(s) Administered   • Influenza, Unspecified 10/21/2015   • flucelvax quad pfs =>4 YRS 11/08/2019        Health Maintenance Due   Topic Date Due   • TDAP/TD VACCINES (1 - Tdap) 11/01/1968   • ZOSTER VACCINE (1 of 2) 11/01/2007   • HEPATITIS C SCREENING  01/21/2019   • ANNUAL PHYSICAL  03/03/2019   • INFLUENZA VACCINE   "08/01/2020        Review of Systems   Constitutional: Negative for chills, fatigue and fever.   HENT: Positive for postnasal drip and sinus pressure. Negative for congestion and ear pain.    Respiratory: Negative for cough, chest tightness, shortness of breath and wheezing.    Cardiovascular: Negative for chest pain and palpitations.   Gastrointestinal: Negative for abdominal pain, blood in stool and constipation.   Skin: Negative for color change.   Allergic/Immunologic: Negative for environmental allergies.   Neurological: Positive for headache. Negative for dizziness and speech difficulty.   Psychiatric/Behavioral: Negative for decreased concentration. The patient is not nervous/anxious.         Vital Signs  Vitals:    09/01/20 1139   BP: 124/80   BP Location: Left arm   Patient Position: Sitting   Cuff Size: Adult   Pulse: 60   Temp: 96.5 °F (35.8 °C)   Weight: 118 kg (259 lb 9.6 oz)   Height: 190.5 cm (75\")   PainSc: 0-No pain       Physical Exam   Constitutional: He is oriented to person, place, and time. He appears well-developed and well-nourished.   HENT:   Head: Normocephalic and atraumatic.   Right Ear: External ear normal.   Left Ear: External ear normal.   Nose: Nose normal.   Mouth/Throat: Oropharynx is clear and moist. No oropharyngeal exudate.   Eyes: Pupils are equal, round, and reactive to light. Conjunctivae and EOM are normal.   Neck: Normal range of motion. Neck supple. No JVD present. No thyromegaly present.   Cardiovascular: Normal rate, regular rhythm, normal heart sounds and intact distal pulses. Exam reveals no gallop and no friction rub.   No murmur heard.  Pulmonary/Chest: Effort normal and breath sounds normal. No respiratory distress. He has no wheezes. He has no rales. He exhibits no tenderness.   Abdominal: Soft. Bowel sounds are normal. He exhibits no distension and no mass. There is no tenderness. There is no rebound and no guarding. No hernia.   Musculoskeletal: Normal range of " motion. He exhibits no tenderness.   Lymphadenopathy:     He has no cervical adenopathy.   Neurological: He is alert and oriented to person, place, and time. He displays normal reflexes. No cranial nerve deficit or sensory deficit. He exhibits normal muscle tone. Coordination normal.   Skin: Skin is warm and dry. No rash noted. No erythema.   Psychiatric: He has a normal mood and affect. His behavior is normal. Judgment and thought content normal.   Nursing note and vitals reviewed.     Procedures     Fall Risk Screen:  STEADI Fall Risk Assessment has not been completed.    Health Habits and Functional and Cognitive Screening:  No flowsheet data found.    Depression Sreening  PHQ-9 Total Score: 0     ACE III MINI             Assessment/Plan:    Joseph was seen today for annual exam.    Diagnoses and all orders for this visit:    Preventative health care    Benign essential hypertension  -     CBC & Differential; Future  -     Comprehensive Metabolic Panel; Future  -     Microalbumin / Creatinine Urine Ratio - Urine, Clean Catch; Future    Mixed hyperlipidemia  -     Lipid Panel; Future    Male hypogonadism  -     Testosterone; Future    Prostate cancer screening  -     PSA Screen; Future    Hypercoagulable state (CMS/HCC)    Postphlebitic syndrome    Gastroesophageal reflux disease without esophagitis    Other orders  -     lisinopril (PRINIVIL,ZESTRIL) 5 MG tablet; Take 1 tablet by mouth Daily.  -     sildenafil (Viagra) 100 MG tablet; Take 1 tablet by mouth Daily As Needed for Erectile Dysfunction.    Plan    Overall he remains in good health with improved lifestyle habits.  I encouraged him to continue working on healthy diet and weight loss.  Colonoscopy was performed 7/25/2016, due at 5 years.    Pressure is well controlled on lisinopril.    Lipid panel is pending, continue atorvastatin and healthy diet.    Testosterone level is pending.  He will continue testosterone supplementation.  We discussed the  physiology of erectile dysfunction.  He was given a trial of sildenafil.    He continues lifelong warfarin for his hypercoagulable state.    He continues to wear compression to control his edema from postphlebitic syndrome.    GERD is well controlled on famotidine.        Labs  Results for orders placed or performed in visit on 08/19/20   POC Protime / INR   Result Value Ref Range    Protime 35.4 seconds    INR 2.9 (A) 0.9 - 1.1        Counseling was given to patient for the following topics: appropriate exercise 50 minutes/week, disease prevention and healthy eating habits.    Plan of care reviewed with patient at the conclusion of today's visit. Education was provided regarding diagnosis, management, and any prescribed or recommended OTC medications.Patient verbalizes understanding of and agreement with management plan.         Jairo Carter MD

## 2020-09-02 LAB
ALBUMIN UR-MCNC: <1.2 MG/DL
CREAT UR-MCNC: 136.6 MG/DL
MICROALBUMIN/CREAT UR: NORMAL MG/G{CREAT}

## 2020-10-07 RX ORDER — WARFARIN SODIUM 5 MG/1
TABLET ORAL
Qty: 45 TABLET | Refills: 2 | Status: SHIPPED | OUTPATIENT
Start: 2020-10-07 | End: 2021-01-21

## 2020-10-19 DIAGNOSIS — E29.1 MALE HYPOGONADISM: ICD-10-CM

## 2020-10-19 RX ORDER — TESTOSTERONE 30 MG/1.5ML
SOLUTION TOPICAL
Qty: 270 ML | Refills: 1 | Status: SHIPPED | OUTPATIENT
Start: 2020-10-19 | End: 2021-09-07

## 2020-11-16 ENCOUNTER — TELEPHONE (OUTPATIENT)
Dept: INTERNAL MEDICINE | Facility: CLINIC | Age: 65
End: 2020-11-16

## 2020-11-16 ENCOUNTER — CLINICAL SUPPORT (OUTPATIENT)
Dept: INTERNAL MEDICINE | Facility: CLINIC | Age: 65
End: 2020-11-16

## 2020-11-16 DIAGNOSIS — Z23 NEEDS FLU SHOT: Primary | ICD-10-CM

## 2020-11-16 DIAGNOSIS — I87.009 POSTPHLEBITIC SYNDROME: ICD-10-CM

## 2020-11-16 LAB
INR PPP: 3 (ref 0.9–1.1)
PROTHROMBIN TIME: 36.3 SECONDS

## 2020-11-16 PROCEDURE — 85610 PROTHROMBIN TIME: CPT | Performed by: INTERNAL MEDICINE

## 2020-11-16 PROCEDURE — 90686 IIV4 VACC NO PRSV 0.5 ML IM: CPT | Performed by: INTERNAL MEDICINE

## 2020-11-16 PROCEDURE — 90471 IMMUNIZATION ADMIN: CPT | Performed by: INTERNAL MEDICINE

## 2020-11-16 PROCEDURE — 36416 COLLJ CAPILLARY BLOOD SPEC: CPT | Performed by: INTERNAL MEDICINE

## 2020-11-16 NOTE — TELEPHONE ENCOUNTER
Patient is inquring about coming up for a PTINR test.  Patient would like a call back.  Please advise      Nemesio Lundberg call back [phone]

## 2020-11-30 RX ORDER — ATORVASTATIN CALCIUM 40 MG/1
TABLET, FILM COATED ORAL
Qty: 90 TABLET | Refills: 1 | Status: SHIPPED | OUTPATIENT
Start: 2020-11-30 | End: 2021-06-11

## 2020-12-15 RX ORDER — ALLOPURINOL 300 MG/1
300 TABLET ORAL DAILY
Qty: 90 TABLET | Refills: 1 | Status: SHIPPED | OUTPATIENT
Start: 2020-12-15 | End: 2021-06-11

## 2021-01-21 RX ORDER — WARFARIN SODIUM 5 MG/1
TABLET ORAL
Qty: 45 TABLET | Refills: 5 | Status: SHIPPED | OUTPATIENT
Start: 2021-01-21 | End: 2021-08-18

## 2021-02-24 RX ORDER — LISINOPRIL 5 MG/1
TABLET ORAL
Qty: 90 TABLET | Refills: 1 | Status: SHIPPED | OUTPATIENT
Start: 2021-02-24 | End: 2021-08-18

## 2021-04-19 ENCOUNTER — OFFICE VISIT (OUTPATIENT)
Dept: INTERNAL MEDICINE | Facility: CLINIC | Age: 66
End: 2021-04-19

## 2021-04-19 ENCOUNTER — LAB (OUTPATIENT)
Dept: LAB | Facility: HOSPITAL | Age: 66
End: 2021-04-19

## 2021-04-19 VITALS
WEIGHT: 262.8 LBS | HEART RATE: 60 BPM | DIASTOLIC BLOOD PRESSURE: 64 MMHG | HEIGHT: 75 IN | BODY MASS INDEX: 32.67 KG/M2 | TEMPERATURE: 97.3 F | SYSTOLIC BLOOD PRESSURE: 114 MMHG

## 2021-04-19 DIAGNOSIS — I10 BENIGN ESSENTIAL HYPERTENSION: ICD-10-CM

## 2021-04-19 DIAGNOSIS — E29.1 MALE HYPOGONADISM: ICD-10-CM

## 2021-04-19 DIAGNOSIS — E78.2 MIXED HYPERLIPIDEMIA: ICD-10-CM

## 2021-04-19 DIAGNOSIS — I10 BENIGN ESSENTIAL HYPERTENSION: Primary | ICD-10-CM

## 2021-04-19 DIAGNOSIS — R00.0 TACHYCARDIA: ICD-10-CM

## 2021-04-19 DIAGNOSIS — D68.59 HYPERCOAGULABLE STATE (HCC): ICD-10-CM

## 2021-04-19 LAB
ALBUMIN SERPL-MCNC: 4.4 G/DL (ref 3.5–5.2)
ALBUMIN/GLOB SERPL: 1.5 G/DL
ALP SERPL-CCNC: 110 U/L (ref 39–117)
ALT SERPL W P-5'-P-CCNC: 22 U/L (ref 1–41)
ANION GAP SERPL CALCULATED.3IONS-SCNC: 9.4 MMOL/L (ref 5–15)
AST SERPL-CCNC: 17 U/L (ref 1–40)
BASOPHILS # BLD AUTO: 0.05 10*3/MM3 (ref 0–0.2)
BASOPHILS NFR BLD AUTO: 0.9 % (ref 0–1.5)
BILIRUB SERPL-MCNC: 1 MG/DL (ref 0–1.2)
BUN SERPL-MCNC: 15 MG/DL (ref 8–23)
BUN/CREAT SERPL: 12.5 (ref 7–25)
CALCIUM SPEC-SCNC: 9.2 MG/DL (ref 8.6–10.5)
CHLORIDE SERPL-SCNC: 101 MMOL/L (ref 98–107)
CHOLEST SERPL-MCNC: 139 MG/DL (ref 0–200)
CO2 SERPL-SCNC: 27.6 MMOL/L (ref 22–29)
CREAT SERPL-MCNC: 1.2 MG/DL (ref 0.76–1.27)
DEPRECATED RDW RBC AUTO: 46 FL (ref 37–54)
EOSINOPHIL # BLD AUTO: 0.1 10*3/MM3 (ref 0–0.4)
EOSINOPHIL NFR BLD AUTO: 1.9 % (ref 0.3–6.2)
ERYTHROCYTE [DISTWIDTH] IN BLOOD BY AUTOMATED COUNT: 14.3 % (ref 12.3–15.4)
GFR SERPL CREATININE-BSD FRML MDRD: 61 ML/MIN/1.73
GLOBULIN UR ELPH-MCNC: 2.9 GM/DL
GLUCOSE SERPL-MCNC: 101 MG/DL (ref 65–99)
HCT VFR BLD AUTO: 49 % (ref 37.5–51)
HDLC SERPL-MCNC: 28 MG/DL (ref 40–60)
HGB BLD-MCNC: 16.8 G/DL (ref 13–17.7)
IMM GRANULOCYTES # BLD AUTO: 0.01 10*3/MM3 (ref 0–0.05)
IMM GRANULOCYTES NFR BLD AUTO: 0.2 % (ref 0–0.5)
INR PPP: 3.2 (ref 0.9–1.1)
LDLC SERPL CALC-MCNC: 91 MG/DL (ref 0–100)
LDLC/HDLC SERPL: 3.22 {RATIO}
LYMPHOCYTES # BLD AUTO: 0.94 10*3/MM3 (ref 0.7–3.1)
LYMPHOCYTES NFR BLD AUTO: 17.6 % (ref 19.6–45.3)
MCH RBC QN AUTO: 30.5 PG (ref 26.6–33)
MCHC RBC AUTO-ENTMCNC: 34.3 G/DL (ref 31.5–35.7)
MCV RBC AUTO: 89.1 FL (ref 79–97)
MONOCYTES # BLD AUTO: 0.33 10*3/MM3 (ref 0.1–0.9)
MONOCYTES NFR BLD AUTO: 6.2 % (ref 5–12)
NEUTROPHILS NFR BLD AUTO: 3.91 10*3/MM3 (ref 1.7–7)
NEUTROPHILS NFR BLD AUTO: 73.2 % (ref 42.7–76)
NRBC BLD AUTO-RTO: 0 /100 WBC (ref 0–0.2)
PLATELET # BLD AUTO: 166 10*3/MM3 (ref 140–450)
PMV BLD AUTO: 10.6 FL (ref 6–12)
POTASSIUM SERPL-SCNC: 4.6 MMOL/L (ref 3.5–5.2)
PROT SERPL-MCNC: 7.3 G/DL (ref 6–8.5)
PROTHROMBIN TIME: 38.1 SECONDS
RBC # BLD AUTO: 5.5 10*6/MM3 (ref 4.14–5.8)
SODIUM SERPL-SCNC: 138 MMOL/L (ref 136–145)
TESTOST SERPL-MCNC: 397 NG/DL (ref 193–740)
TRIGL SERPL-MCNC: 104 MG/DL (ref 0–150)
VLDLC SERPL-MCNC: 20 MG/DL (ref 5–40)
WBC # BLD AUTO: 5.34 10*3/MM3 (ref 3.4–10.8)

## 2021-04-19 PROCEDURE — 85025 COMPLETE CBC W/AUTO DIFF WBC: CPT

## 2021-04-19 PROCEDURE — 85610 PROTHROMBIN TIME: CPT | Performed by: INTERNAL MEDICINE

## 2021-04-19 PROCEDURE — 84403 ASSAY OF TOTAL TESTOSTERONE: CPT

## 2021-04-19 PROCEDURE — 80053 COMPREHEN METABOLIC PANEL: CPT

## 2021-04-19 PROCEDURE — 99214 OFFICE O/P EST MOD 30 MIN: CPT | Performed by: INTERNAL MEDICINE

## 2021-04-19 PROCEDURE — 36416 COLLJ CAPILLARY BLOOD SPEC: CPT | Performed by: INTERNAL MEDICINE

## 2021-04-19 PROCEDURE — 80061 LIPID PANEL: CPT

## 2021-04-19 NOTE — PROGRESS NOTES
[facility]     Joseph Lundberg  1955   [id number]      Patient Care Team:  Jairo Carter MD as PCP - General (Internal Medicine)    Chief Complaint::   Chief Complaint   Patient presents with   • Hypertension   • Hyperlipidemia        HPI  Mr. Lundberg comes in for follow-up of his hypertension, hyperlipidemia, hypercoagulable state and hypergonadism.  Overall he feels well.  He has 2 concerns.  One is that recently he has awakened at 3 or 3:30 in the morning and it takes him an hour sometimes to get back to sleep.  He denies daytime somnolence but admits that the end of the day after work sometimes he needs a nap.  His wife tells him that he does snore some.  The other issue is that about once a month he has a morning from his watch that his heart rate is fast.  He has absolutely no symptoms.  This can occur at rest, and when that occurs the heart rate is about 115.  There is no fever, cough, shortness of breath or chest pain.  He has had both doses of the Covid vaccine.    Chronic Conditions:      Patient Active Problem List   Diagnosis   • AR (allergic rhinitis)   • Benign essential hypertension   • Dyspnea on exertion   • Foot pain   • GERD (gastroesophageal reflux disease)   • Hypercoagulable state (CMS/HCC)   • HLD (hyperlipidemia)   • Male hypogonadism   • Mild persistent asthma   • Postphlebitic syndrome   • Pulmonary embolism (CMS/HCC)        Past Medical History:   Diagnosis Date   • Achilles tendon injury    • Nephrolithiasis      (uric acid/calcium)   • Pulmonary embolism (CMS/HCC) 2011   • Pulmonary embolism (CMS/HCC) 2002    DVT   • Pulmonary embolism (CMS/HCC) 2010    DVT    • Torn meniscus 2002    left    • Torn meniscus 2008    left       Past Surgical History:   Procedure Laterality Date   • ACHILLES TENDON REPAIR  2010   • FL CYSTO URETHROSCOPY W REM FB     • KNEE ARTHROSCOPY W/ MENISCAL REPAIR Left 2008   • KNEE ARTHROSCOPY W/ MENISCAL REPAIR Left 2002   • TONSILLECTOMY      • VASECTOMY  1994       Family History   Problem Relation Age of Onset   • Heart attack Mother 73   • Heart disease Mother    • Heart attack Father 28        and 62   • Heart disease Father    • Coronary artery disease Brother 43        premature    • Heart disease Brother    • Heart attack Paternal Uncle 61   • Heart disease Paternal Uncle    • Kidney disease Paternal Uncle    • Stroke Maternal Grandmother        Social History     Socioeconomic History   • Marital status:      Spouse name: Not on file   • Number of children: Not on file   • Years of education: Not on file   • Highest education level: Not on file   Tobacco Use   • Smoking status: Never Smoker   • Smokeless tobacco: Never Used   Substance and Sexual Activity   • Alcohol use: Yes     Alcohol/week: 1.0 standard drinks     Types: 1 Cans of beer per week   • Drug use: Never   • Sexual activity: Never       Allergies   Allergen Reactions   • Penicillins Unknown (See Comments)     Unknown         Current Outpatient Medications:   •  allopurinol (ZYLOPRIM) 300 MG tablet, Take 1 tablet by mouth Daily., Disp: 90 tablet, Rfl: 1  •  atorvastatin (LIPITOR) 40 MG tablet, TAKE 1 TABLET DAILY, Disp: 90 tablet, Rfl: 1  •  Cetirizine HCl (ZYRTEC ALLERGY) 10 MG capsule, Take 1 capsule by mouth Daily As Needed., Disp: , Rfl:   •  famotidine (PEPCID) 20 MG tablet, Take 1 tablet by mouth As Needed., Disp: , Rfl:   •  lisinopril (PRINIVIL,ZESTRIL) 5 MG tablet, TAKE 1 TABLET DAILY, Disp: 90 tablet, Rfl: 1  •  sildenafil (Viagra) 100 MG tablet, Take 1 tablet by mouth Daily As Needed for Erectile Dysfunction., Disp: 10 tablet, Rfl: 5  •  Testosterone 30 MG/ACT solution, APPLY 1 ACTUATION TO EACH UNDERARM DAILY, Disp: 270 mL, Rfl: 1  •  warfarin (Jantoven) 5 MG tablet, TAKE 1 TABLET EVERY OTHER  DAY ALTERNATING WITH 1 AND 1/2 TABLETS EVERY OTHER    DAY, Disp: 45 tablet, Rfl: 5    Review of Systems   Constitutional: Negative for chills, fatigue and fever.   HENT:  "Negative for congestion, ear pain and sinus pressure.    Respiratory: Negative for cough, chest tightness, shortness of breath and wheezing.    Cardiovascular: Negative for chest pain and palpitations.   Gastrointestinal: Negative for abdominal pain, blood in stool and constipation.   Skin: Negative for color change.   Allergic/Immunologic: Positive for environmental allergies.   Neurological: Negative for dizziness, speech difficulty and headache.   Psychiatric/Behavioral: Negative for decreased concentration. The patient is not nervous/anxious.         Vital Signs  Vitals:    04/19/21 0815   BP: 114/64   BP Location: Left arm   Patient Position: Sitting   Cuff Size: Large Adult   Pulse: 60   Temp: 97.3 °F (36.3 °C)   Weight: 119 kg (262 lb 12.8 oz)   Height: 190.5 cm (75\")   PainSc: 0-No pain       Physical Exam  Vitals reviewed.   Constitutional:       Appearance: He is well-developed.   HENT:      Head: Normocephalic and atraumatic.   Cardiovascular:      Rate and Rhythm: Normal rate and regular rhythm.      Heart sounds: Normal heart sounds. No murmur heard.     Pulmonary:      Effort: Pulmonary effort is normal.      Breath sounds: Normal breath sounds.   Neurological:      Mental Status: He is alert and oriented to person, place, and time.          Procedures    ACE III MINI             Assessment/Plan:    Diagnoses and all orders for this visit:    1. Benign essential hypertension (Primary)  -     CBC & Differential; Future    2. Mixed hyperlipidemia  -     Comprehensive Metabolic Panel; Future  -     Lipid Panel; Future    3. Hypercoagulable state (CMS/HCC)  -     POC Protime / INR    4. Male hypogonadism  -     Testosterone; Future    5. Tachycardia    Plan    Blood pressure is well controlled on lisinopril.    Lipid panel is pending, he will continue atorvastatin and healthy diet.    We will continue lifelong warfarin for hypercoagulable state and postphlebitic syndrome.    Testosterone level is pending " he continues topical testosterone.    Tachycardia is asymptomatic and would not be known if it were for his watch.  As he is completely asymptomatic, very infrequent, and already anticoagulated, I think further work-up such as cardiac monitoring would be a fairly low yield work-up.    He will talk to his wife about the nature of snoring.  Certainly simply waking up and not being able to get back to sleep is not symptoms of sleep apnea but if she is noticed any breathing problems will refer for sleep evaluation.      Plan of care reviewed with patient at the conclusion of today's visit. Education was provided regarding diagnosis, management, and any prescribed or recommended OTC medications.Patient verbalizes understanding of and agreement with management plan.         Jairo Carter MD

## 2021-06-11 RX ORDER — ALLOPURINOL 300 MG/1
TABLET ORAL
Qty: 90 TABLET | Refills: 1 | Status: SHIPPED | OUTPATIENT
Start: 2021-06-11 | End: 2021-12-06

## 2021-06-11 RX ORDER — ATORVASTATIN CALCIUM 40 MG/1
TABLET, FILM COATED ORAL
Qty: 90 TABLET | Refills: 1 | Status: SHIPPED | OUTPATIENT
Start: 2021-06-11 | End: 2021-11-22

## 2021-07-21 ENCOUNTER — TELEPHONE (OUTPATIENT)
Dept: INTERNAL MEDICINE | Facility: CLINIC | Age: 66
End: 2021-07-21

## 2021-07-21 DIAGNOSIS — D68.59 HYPERCOAGULABLE STATE: Primary | ICD-10-CM

## 2021-07-21 NOTE — TELEPHONE ENCOUNTER
Caller: Joseph Lundberg     Relationship: SELF    Best call back number: [phone]     What orders are you requesting (i.e. lab or imaging): LAB FOR PT INR    In what timeframe would the patient need to come in: REQUEST TO COME TOMORROW    Where will you receive your lab/imaging services: AT [facility]    Additional notes: N/A

## 2021-07-22 ENCOUNTER — CLINICAL SUPPORT (OUTPATIENT)
Dept: INTERNAL MEDICINE | Facility: CLINIC | Age: 66
End: 2021-07-22

## 2021-07-22 DIAGNOSIS — Z79.01 LONG TERM (CURRENT) USE OF ANTICOAGULANTS: ICD-10-CM

## 2021-07-22 DIAGNOSIS — Z51.81 ENCOUNTER FOR THERAPEUTIC DRUG MONITORING: Primary | ICD-10-CM

## 2021-07-22 LAB
INR PPP: 3.9 (ref 0.9–1.1)
PROTHROMBIN TIME: 47.1 SECONDS

## 2021-07-22 PROCEDURE — 36416 COLLJ CAPILLARY BLOOD SPEC: CPT | Performed by: INTERNAL MEDICINE

## 2021-07-22 PROCEDURE — 85610 PROTHROMBIN TIME: CPT | Performed by: INTERNAL MEDICINE

## 2021-08-18 RX ORDER — LISINOPRIL 5 MG/1
TABLET ORAL
Qty: 90 TABLET | Refills: 1 | Status: SHIPPED | OUTPATIENT
Start: 2021-08-18 | End: 2021-10-29

## 2021-08-18 RX ORDER — WARFARIN SODIUM 5 MG/1
TABLET ORAL
Qty: 45 TABLET | Refills: 5 | Status: SHIPPED | OUTPATIENT
Start: 2021-08-18 | End: 2022-02-01 | Stop reason: SDUPTHER

## 2021-09-03 DIAGNOSIS — E29.1 MALE HYPOGONADISM: ICD-10-CM

## 2021-09-07 RX ORDER — TESTOSTERONE 30 MG/1.5ML
SOLUTION TOPICAL
Qty: 270 ML | Refills: 1 | Status: SHIPPED | OUTPATIENT
Start: 2021-09-07 | End: 2022-06-06

## 2021-09-07 NOTE — TELEPHONE ENCOUNTER
Rx Refill Note  Requested Prescriptions     Pending Prescriptions Disp Refills   • Testosterone 30 MG/ACT solution [Pharmacy Med Name: TESTOSTERONE 30 MG/1.5 ML PUMP] 270 mL      Sig: APPLY 1 ACTUATION TO EACH UNDERARM DAILY      Last office visit with prescribing clinician: 4/19/2021      Next office visit with prescribing clinician: 10/22/2021   LA: 10/19/20 #270 1R             Leslee Herndon LPN  09/07/21, 09:31 EDT

## 2021-10-29 ENCOUNTER — OFFICE VISIT (OUTPATIENT)
Dept: INTERNAL MEDICINE | Facility: CLINIC | Age: 66
End: 2021-10-29

## 2021-10-29 ENCOUNTER — LAB (OUTPATIENT)
Dept: LAB | Facility: HOSPITAL | Age: 66
End: 2021-10-29

## 2021-10-29 VITALS
WEIGHT: 250.2 LBS | BODY MASS INDEX: 30.47 KG/M2 | TEMPERATURE: 98 F | HEIGHT: 76 IN | HEART RATE: 60 BPM | SYSTOLIC BLOOD PRESSURE: 92 MMHG | DIASTOLIC BLOOD PRESSURE: 60 MMHG

## 2021-10-29 DIAGNOSIS — Z12.5 SCREENING FOR PROSTATE CANCER: ICD-10-CM

## 2021-10-29 DIAGNOSIS — E78.2 MIXED HYPERLIPIDEMIA: ICD-10-CM

## 2021-10-29 DIAGNOSIS — Z11.59 ENCOUNTER FOR HEPATITIS C SCREENING TEST FOR LOW RISK PATIENT: ICD-10-CM

## 2021-10-29 DIAGNOSIS — I10 BENIGN ESSENTIAL HYPERTENSION: ICD-10-CM

## 2021-10-29 DIAGNOSIS — E29.1 MALE HYPOGONADISM: ICD-10-CM

## 2021-10-29 DIAGNOSIS — D68.59 HYPERCOAGULABLE STATE (HCC): ICD-10-CM

## 2021-10-29 DIAGNOSIS — Z00.00 ROUTINE GENERAL MEDICAL EXAMINATION AT A HEALTH CARE FACILITY: Primary | ICD-10-CM

## 2021-10-29 DIAGNOSIS — I87.009 POSTPHLEBITIC SYNDROME: ICD-10-CM

## 2021-10-29 DIAGNOSIS — Z12.11 COLON CANCER SCREENING: ICD-10-CM

## 2021-10-29 PROBLEM — M79.673 FOOT PAIN: Status: RESOLVED | Noted: 2019-03-05 | Resolved: 2021-10-29

## 2021-10-29 LAB
ALBUMIN SERPL-MCNC: 4.6 G/DL (ref 3.5–5.2)
ALBUMIN/GLOB SERPL: 1.4 G/DL
ALP SERPL-CCNC: 125 U/L (ref 39–117)
ALT SERPL W P-5'-P-CCNC: 21 U/L (ref 1–41)
ANION GAP SERPL CALCULATED.3IONS-SCNC: 8.3 MMOL/L (ref 5–15)
AST SERPL-CCNC: 21 U/L (ref 1–40)
BASOPHILS # BLD AUTO: 0.04 10*3/MM3 (ref 0–0.2)
BASOPHILS NFR BLD AUTO: 0.9 % (ref 0–1.5)
BILIRUB SERPL-MCNC: 1.3 MG/DL (ref 0–1.2)
BUN SERPL-MCNC: 14 MG/DL (ref 8–23)
BUN/CREAT SERPL: 10.1 (ref 7–25)
CALCIUM SPEC-SCNC: 9.6 MG/DL (ref 8.6–10.5)
CHLORIDE SERPL-SCNC: 103 MMOL/L (ref 98–107)
CHOLEST SERPL-MCNC: 141 MG/DL (ref 0–200)
CO2 SERPL-SCNC: 27.7 MMOL/L (ref 22–29)
CREAT SERPL-MCNC: 1.38 MG/DL (ref 0.76–1.27)
DEPRECATED RDW RBC AUTO: 47.6 FL (ref 37–54)
EOSINOPHIL # BLD AUTO: 0.08 10*3/MM3 (ref 0–0.4)
EOSINOPHIL NFR BLD AUTO: 1.8 % (ref 0.3–6.2)
ERYTHROCYTE [DISTWIDTH] IN BLOOD BY AUTOMATED COUNT: 14.7 % (ref 12.3–15.4)
GFR SERPL CREATININE-BSD FRML MDRD: 52 ML/MIN/1.73
GLOBULIN UR ELPH-MCNC: 3.2 GM/DL
GLUCOSE SERPL-MCNC: 96 MG/DL (ref 65–99)
HCT VFR BLD AUTO: 48.9 % (ref 37.5–51)
HCV AB SER DONR QL: NORMAL
HDLC SERPL-MCNC: 26 MG/DL (ref 40–60)
HGB BLD-MCNC: 16.4 G/DL (ref 13–17.7)
IMM GRANULOCYTES # BLD AUTO: 0.01 10*3/MM3 (ref 0–0.05)
IMM GRANULOCYTES NFR BLD AUTO: 0.2 % (ref 0–0.5)
INR PPP: 3.1 (ref 0.9–1.1)
LDLC SERPL CALC-MCNC: 95 MG/DL (ref 0–100)
LDLC/HDLC SERPL: 3.59 {RATIO}
LYMPHOCYTES # BLD AUTO: 0.95 10*3/MM3 (ref 0.7–3.1)
LYMPHOCYTES NFR BLD AUTO: 21.4 % (ref 19.6–45.3)
MCH RBC QN AUTO: 29.9 PG (ref 26.6–33)
MCHC RBC AUTO-ENTMCNC: 33.5 G/DL (ref 31.5–35.7)
MCV RBC AUTO: 89.2 FL (ref 79–97)
MONOCYTES # BLD AUTO: 0.34 10*3/MM3 (ref 0.1–0.9)
MONOCYTES NFR BLD AUTO: 7.7 % (ref 5–12)
NEUTROPHILS NFR BLD AUTO: 3.01 10*3/MM3 (ref 1.7–7)
NEUTROPHILS NFR BLD AUTO: 68 % (ref 42.7–76)
NRBC BLD AUTO-RTO: 0 /100 WBC (ref 0–0.2)
PLATELET # BLD AUTO: 161 10*3/MM3 (ref 140–450)
PMV BLD AUTO: 10.7 FL (ref 6–12)
POTASSIUM SERPL-SCNC: 4.4 MMOL/L (ref 3.5–5.2)
PROT SERPL-MCNC: 7.8 G/DL (ref 6–8.5)
PROTHROMBIN TIME: 37.4 SECONDS
PSA SERPL-MCNC: 1.75 NG/ML (ref 0–4)
RBC # BLD AUTO: 5.48 10*6/MM3 (ref 4.14–5.8)
SODIUM SERPL-SCNC: 139 MMOL/L (ref 136–145)
TESTOST SERPL-MCNC: 396 NG/DL (ref 193–740)
TRIGL SERPL-MCNC: 108 MG/DL (ref 0–150)
VLDLC SERPL-MCNC: 20 MG/DL (ref 5–40)
WBC # BLD AUTO: 4.43 10*3/MM3 (ref 3.4–10.8)

## 2021-10-29 PROCEDURE — 86803 HEPATITIS C AB TEST: CPT

## 2021-10-29 PROCEDURE — 90471 IMMUNIZATION ADMIN: CPT | Performed by: INTERNAL MEDICINE

## 2021-10-29 PROCEDURE — 80061 LIPID PANEL: CPT

## 2021-10-29 PROCEDURE — 90472 IMMUNIZATION ADMIN EACH ADD: CPT | Performed by: INTERNAL MEDICINE

## 2021-10-29 PROCEDURE — 99396 PREV VISIT EST AGE 40-64: CPT | Performed by: INTERNAL MEDICINE

## 2021-10-29 PROCEDURE — 85610 PROTHROMBIN TIME: CPT | Performed by: INTERNAL MEDICINE

## 2021-10-29 PROCEDURE — 90750 HZV VACC RECOMBINANT IM: CPT | Performed by: INTERNAL MEDICINE

## 2021-10-29 PROCEDURE — 80053 COMPREHEN METABOLIC PANEL: CPT

## 2021-10-29 PROCEDURE — 84403 ASSAY OF TOTAL TESTOSTERONE: CPT

## 2021-10-29 PROCEDURE — 85025 COMPLETE CBC W/AUTO DIFF WBC: CPT

## 2021-10-29 PROCEDURE — 82043 UR ALBUMIN QUANTITATIVE: CPT

## 2021-10-29 PROCEDURE — 36416 COLLJ CAPILLARY BLOOD SPEC: CPT | Performed by: INTERNAL MEDICINE

## 2021-10-29 PROCEDURE — G0103 PSA SCREENING: HCPCS

## 2021-10-29 PROCEDURE — 90686 IIV4 VACC NO PRSV 0.5 ML IM: CPT | Performed by: INTERNAL MEDICINE

## 2021-10-29 PROCEDURE — 82570 ASSAY OF URINE CREATININE: CPT

## 2021-10-29 NOTE — PROGRESS NOTES
[facility]     Joseph Lundberg  1955   [id number]      Patient Care Team:  Jairo Carter MD as PCP - General (Internal Medicine)    Chief Complaint::   Chief Complaint   Patient presents with   • Annual Exam        HPI  Mr. Lundberg is now 65.  He comes in for follow-up of his hypertension, hyper lipidemia, hypergonadism, postphlebitic syndrome, hypercoagulable state and for annual examination.  He retired this summer.  He has been able to lose weight.  He feels well.  He controls his edema by wearing compression.  There is no fever, cough, shortness of breath or chest pain.    Chronic Conditions:      Patient Active Problem List   Diagnosis   • AR (allergic rhinitis)   • Benign essential hypertension   • Dyspnea on exertion   • GERD (gastroesophageal reflux disease)   • Hypercoagulable state (HCC)   • HLD (hyperlipidemia)   • Male hypogonadism   • Mild persistent asthma   • Postphlebitic syndrome   • Pulmonary embolism (HCC)        Past Medical History:   Diagnosis Date   • Achilles tendon injury    • Nephrolithiasis      (uric acid/calcium)   • Pulmonary embolism (HCC) 2011   • Pulmonary embolism (HCC) 2002    DVT   • Pulmonary embolism (HCC) 2010    DVT    • Torn meniscus 2002    left    • Torn meniscus 2008    left       Past Surgical History:   Procedure Laterality Date   • ACHILLES TENDON REPAIR  2010   • FL CYSTO URETHROSCOPY W REM FB     • KNEE ARTHROSCOPY W/ MENISCAL REPAIR Left 2008   • KNEE ARTHROSCOPY W/ MENISCAL REPAIR Left 2002   • TONSILLECTOMY     • VASECTOMY  1994       Family History   Problem Relation Age of Onset   • Heart attack Mother 73   • Heart disease Mother    • Heart attack Father 28        and 62   • Heart disease Father    • Coronary artery disease Brother 43        premature    • Heart disease Brother    • Heart attack Paternal Uncle 61   • Heart disease Paternal Uncle    • Kidney disease Paternal Uncle    • Stroke Maternal Grandmother        Social History      Socioeconomic History   • Marital status:    Tobacco Use   • Smoking status: Never Smoker   • Smokeless tobacco: Never Used   Substance and Sexual Activity   • Alcohol use: Yes     Alcohol/week: 1.0 standard drink     Types: 1 Cans of beer per week   • Drug use: Never   • Sexual activity: Never       Allergies   Allergen Reactions   • Penicillins Unknown (See Comments)     Unknown         Current Outpatient Medications:   •  allopurinol (ZYLOPRIM) 300 MG tablet, TAKE 1 TABLET DAILY, Disp: 90 tablet, Rfl: 1  •  atorvastatin (LIPITOR) 40 MG tablet, TAKE 1 TABLET DAILY, Disp: 90 tablet, Rfl: 1  •  Cetirizine HCl (ZYRTEC ALLERGY) 10 MG capsule, Take 1 capsule by mouth Daily As Needed., Disp: , Rfl:   •  famotidine (PEPCID) 20 MG tablet, Take 1 tablet by mouth As Needed., Disp: , Rfl:   •  sildenafil (Viagra) 100 MG tablet, Take 1 tablet by mouth Daily As Needed for Erectile Dysfunction., Disp: 10 tablet, Rfl: 5  •  Testosterone 30 MG/ACT solution, APPLY 1 ACTUATION TO EACH UNDERARM DAILY, Disp: 270 mL, Rfl: 1  •  warfarin (Jantoven) 5 MG tablet, TAKE 1 TABLET EVERY OTHER  DAY ALTERNATING WITH 1 AND 1/2 TABLETS EVERY OTHER    DAY, Disp: 45 tablet, Rfl: 5    Immunization History   Administered Date(s) Administered   • COVID-19 (PFIZER) 03/04/2021, 03/30/2021   • FluLaval/Fluarix/Fluzone >6 11/16/2020, 10/29/2021   • Influenza Quad Vaccine (Inpatient) 10/21/2015   • Influenza, Unspecified 10/21/2015   • Shingrix 10/29/2021   • flucelvax quad pfs =>4 YRS 11/08/2019        Health Maintenance Due   Topic Date Due   • Pneumococcal Vaccine 0-64 (1 of 2 - PPSV23) Never done   • TDAP/TD VACCINES (1 - Tdap) Never done   • HEPATITIS C SCREENING  Never done   • ANNUAL PHYSICAL  09/02/2021   • COVID-19 Vaccine (3 - Pfizer booster) 09/30/2021        Review of Systems   Constitutional: Negative for activity change, chills, fatigue, fever, unexpected weight gain and unexpected weight loss.   HENT: Negative for congestion, ear  "pain, hearing loss, postnasal drip and trouble swallowing.    Eyes: Negative for blurred vision and visual disturbance.   Respiratory: Negative for cough and shortness of breath.    Cardiovascular: Negative for chest pain, palpitations and leg swelling.   Gastrointestinal: Negative for abdominal pain, blood in stool, constipation, diarrhea and indigestion.   Endocrine: Negative for cold intolerance, heat intolerance, polydipsia and polyuria.   Genitourinary: Negative for difficulty urinating, discharge, erectile dysfunction and testicular pain.   Musculoskeletal: Negative for back pain, gait problem, joint swelling and myalgias.   Skin: Negative for skin lesions.   Allergic/Immunologic: Negative for environmental allergies.   Neurological: Negative for dizziness, syncope, speech difficulty, weakness, numbness, headache, memory problem and confusion.   Hematological: Negative for adenopathy. Does not bruise/bleed easily.   Psychiatric/Behavioral: Negative for decreased concentration, sleep disturbance, depressed mood and stress. The patient is not nervous/anxious.         Vital Signs  Vitals:    10/29/21 0947   BP: 92/60   BP Location: Left arm   Patient Position: Sitting   Cuff Size: Large Adult   Pulse: 60   Temp: 98 °F (36.7 °C)   Weight: 113 kg (250 lb 3.2 oz)   Height: 191.8 cm (75.5\")   PainSc: 0-No pain       Physical Exam  Vitals and nursing note reviewed.   Constitutional:       Appearance: He is well-developed.   HENT:      Head: Normocephalic and atraumatic.      Right Ear: External ear normal.      Left Ear: External ear normal.      Nose: Nose normal.      Mouth/Throat:      Pharynx: No oropharyngeal exudate.   Eyes:      Conjunctiva/sclera: Conjunctivae normal.      Pupils: Pupils are equal, round, and reactive to light.   Neck:      Thyroid: No thyromegaly.      Vascular: No JVD.   Cardiovascular:      Rate and Rhythm: Normal rate and regular rhythm.      Heart sounds: Normal heart sounds. No murmur " heard.  No friction rub. No gallop.    Pulmonary:      Effort: Pulmonary effort is normal. No respiratory distress.      Breath sounds: Normal breath sounds. No wheezing or rales.   Chest:      Chest wall: No tenderness.   Abdominal:      General: Bowel sounds are normal. There is no distension.      Palpations: Abdomen is soft. There is no mass.      Tenderness: There is no abdominal tenderness. There is no guarding or rebound.      Hernia: No hernia is present.   Musculoskeletal:         General: No tenderness. Normal range of motion.      Cervical back: Normal range of motion and neck supple.   Lymphadenopathy:      Cervical: No cervical adenopathy.   Skin:     General: Skin is warm and dry.      Findings: No erythema or rash.      Comments: Diffuse seborrheic keratoses scattered across the trunk.   Neurological:      Mental Status: He is alert and oriented to person, place, and time.      Cranial Nerves: No cranial nerve deficit.      Sensory: No sensory deficit.      Motor: No abnormal muscle tone.      Coordination: Coordination normal.      Deep Tendon Reflexes: Reflexes normal.   Psychiatric:         Behavior: Behavior normal.         Thought Content: Thought content normal.         Judgment: Judgment normal.          Procedures     Fall Risk Screen:  [facility] Fall Risk Assessment has not been completed.    Health Habits and Functional and Cognitive Screening:  No flowsheet data found.    Depression [place]  PHQ-9 Total Score:       ACE III MINI             Assessment/Plan:    Diagnoses and all orders for this visit:    1. Routine general medical examination at a health care facility (Primary)    2. Colon cancer screening  -     Ambulatory Referral to Gastroenterology    3. Benign essential hypertension  -     CBC & Differential; Future  -     Microalbumin / Creatinine Urine Ratio - Urine, Clean Catch; Future    4. Mixed hyperlipidemia  -     Comprehensive Metabolic Panel; Future  -     Lipid Panel;  Future    5. Male hypogonadism  -     Testosterone; Future    6. Screening for prostate cancer  -     PSA Screen; Future    7. Encounter for hepatitis C screening test for low risk patient  -     Hepatitis C Antibody; Future    8. Hypercoagulable state (HCC)  -     POC Protime / INR    9. Postphlebitic syndrome    Other orders  -     FluLaval/Fluarix/Fluzone >6 Months  -     Shingrix Vaccine    Plan    Overall he has been able to improve fitness and lose weight during his halfway.  He is up-to-date on colorectal cancer screening and fully vaccinated against COVID-19.    Blood pressure is well controlled on nonpharmacologic therapy.    Lipid panel is pending, continue atorvastatin, healthy diet and continued weight loss.    Testosterone level is pending, he will continue topical testosterone.    He will continue lifelong anticoagulation for hypercoagulable state and postphlebitic syndrome.    Patient's Body mass index is 30.86 kg/m². indicating that he is obese (BMI >30). Obesity-related health conditions include the following: hypertension and dyslipidemias. Obesity is improving with lifestyle modifications. BMI is is above average; BMI management plan is completed. We discussed portion control, increasing exercise and joining a fitness center or start home based exercise program..            Labs  Results for orders placed or performed in visit on 10/29/21   POC Protime / INR    Specimen: Blood   Result Value Ref Range    Protime 37.4 seconds    INR 3.1 (A) 0.9 - 1.1        Counseling was given to patient for the following topics: appropriate exercise as he is doing, disease prevention and healthy eating habits.    Plan of care reviewed with patient at the conclusion of today's visit. Education was provided regarding diagnosis, management, and any prescribed or recommended OTC medications.Patient verbalizes understanding of and agreement with management plan.         Jairo Carter MD

## 2021-10-30 LAB
ALBUMIN UR-MCNC: <1.2 MG/DL
CREAT UR-MCNC: 189.9 MG/DL
MICROALBUMIN/CREAT UR: NORMAL MG/G{CREAT}

## 2021-11-02 DIAGNOSIS — Z12.11 SCREENING FOR COLON CANCER: Primary | ICD-10-CM

## 2021-11-02 RX ORDER — SODIUM, POTASSIUM,MAG SULFATES 17.5-3.13G
1 SOLUTION, RECONSTITUTED, ORAL ORAL TAKE AS DIRECTED
Qty: 354 ML | Refills: 0 | Status: SHIPPED | OUTPATIENT
Start: 2021-11-02 | End: 2022-06-01

## 2021-11-03 ENCOUNTER — TELEPHONE (OUTPATIENT)
Dept: GASTROENTEROLOGY | Facility: CLINIC | Age: 66
End: 2021-11-03

## 2021-11-03 NOTE — TELEPHONE ENCOUNTER
I tried to call Mr Lundberg to give cardiac clearance from Dr Carter. Wife answered. I will send message to patient's my chart. Wife isn't on patient's JUDY form.

## 2021-11-03 NOTE — TELEPHONE ENCOUNTER
Patient is scheduled for a colonoscopy with Dr Olson on 11/16/21.  He's currently taking Warfarin prescribed by Dr Jairo Carter.

## 2021-11-16 ENCOUNTER — OUTSIDE FACILITY SERVICE (OUTPATIENT)
Dept: GASTROENTEROLOGY | Facility: CLINIC | Age: 66
End: 2021-11-16

## 2021-11-16 PROCEDURE — 45385 COLONOSCOPY W/LESION REMOVAL: CPT | Performed by: INTERNAL MEDICINE

## 2021-11-16 PROCEDURE — 88305 TISSUE EXAM BY PATHOLOGIST: CPT | Performed by: INTERNAL MEDICINE

## 2021-11-17 ENCOUNTER — LAB REQUISITION (OUTPATIENT)
Dept: LAB | Facility: HOSPITAL | Age: 66
End: 2021-11-17

## 2021-11-17 DIAGNOSIS — Z86.010 PERSONAL HISTORY OF COLONIC POLYPS: ICD-10-CM

## 2021-11-17 DIAGNOSIS — K64.8 OTHER HEMORRHOIDS: ICD-10-CM

## 2021-11-17 DIAGNOSIS — K57.30 DIVERTICULOSIS OF LARGE INTESTINE WITHOUT PERFORATION OR ABSCESS WITHOUT BLEEDING: ICD-10-CM

## 2021-11-17 DIAGNOSIS — K63.5 POLYP OF COLON: ICD-10-CM

## 2021-11-18 ENCOUNTER — TELEPHONE (OUTPATIENT)
Dept: GASTROENTEROLOGY | Facility: CLINIC | Age: 66
End: 2021-11-18

## 2021-11-18 LAB
CYTO UR: NORMAL
LAB AP CASE REPORT: NORMAL
LAB AP CLINICAL INFORMATION: NORMAL
PATH REPORT.FINAL DX SPEC: NORMAL
PATH REPORT.GROSS SPEC: NORMAL

## 2021-11-18 NOTE — TELEPHONE ENCOUNTER
I tried to call Mr Lundberg to give biopsy results. No answer; left voice message. I will send result letter to my chart and through the mail.

## 2021-11-18 NOTE — TELEPHONE ENCOUNTER
----- Message from Kadeem Olson MD sent at 11/18/2021  9:59 AM EST -----  Let Mr. Lundberg know there were 2 serrated adenomas.  He should have a repeat examination in 3 years.

## 2021-11-22 RX ORDER — ATORVASTATIN CALCIUM 40 MG/1
TABLET, FILM COATED ORAL
Qty: 90 TABLET | Refills: 1 | Status: SHIPPED | OUTPATIENT
Start: 2021-11-22 | End: 2022-05-31

## 2021-12-06 RX ORDER — ALLOPURINOL 300 MG/1
TABLET ORAL
Qty: 90 TABLET | Refills: 1 | Status: SHIPPED | OUTPATIENT
Start: 2021-12-06 | End: 2022-05-31

## 2021-12-06 NOTE — TELEPHONE ENCOUNTER
Rx Refill Note  Requested Prescriptions     Pending Prescriptions Disp Refills   • allopurinol (ZYLOPRIM) 300 MG tablet [Pharmacy Med Name: ALLOPURINOL  TAB 300MG] 90 tablet 1     Sig: TAKE 1 TABLET DAILY      Last office visit with prescribing clinician: 10/29/2021      Next office visit with prescribing clinician: 4/28/2022            Leeanna Gates  12/06/21, 09:22 EST

## 2022-02-01 ENCOUNTER — CLINICAL SUPPORT (OUTPATIENT)
Dept: INTERNAL MEDICINE | Facility: CLINIC | Age: 67
End: 2022-02-01

## 2022-02-01 DIAGNOSIS — D68.59 HYPERCOAGULABLE STATE: ICD-10-CM

## 2022-02-01 DIAGNOSIS — Z12.11 COLON CANCER SCREENING: Primary | ICD-10-CM

## 2022-02-01 DIAGNOSIS — Z51.81 THERAPEUTIC DRUG MONITORING: Chronic | ICD-10-CM

## 2022-02-01 DIAGNOSIS — Z79.01 ANTICOAGULATED ON WARFARIN: Chronic | ICD-10-CM

## 2022-02-01 DIAGNOSIS — Z23 NEED FOR VACCINATION: Primary | ICD-10-CM

## 2022-02-01 LAB
INR PPP: 4.7 (ref 0.9–1.1)
PROTHROMBIN TIME: 56.7 SECONDS

## 2022-02-01 PROCEDURE — 90750 HZV VACC RECOMBINANT IM: CPT | Performed by: INTERNAL MEDICINE

## 2022-02-01 PROCEDURE — 85610 PROTHROMBIN TIME: CPT | Performed by: INTERNAL MEDICINE

## 2022-02-01 PROCEDURE — 93793 ANTICOAG MGMT PT WARFARIN: CPT | Performed by: INTERNAL MEDICINE

## 2022-02-01 PROCEDURE — 36416 COLLJ CAPILLARY BLOOD SPEC: CPT | Performed by: INTERNAL MEDICINE

## 2022-02-01 RX ORDER — WARFARIN SODIUM 5 MG/1
TABLET ORAL
Qty: 45 TABLET | Refills: 5 | Status: SHIPPED | OUTPATIENT
Start: 2022-02-01 | End: 2022-04-05

## 2022-02-04 ENCOUNTER — CLINICAL SUPPORT (OUTPATIENT)
Dept: INTERNAL MEDICINE | Facility: CLINIC | Age: 67
End: 2022-02-04

## 2022-02-04 DIAGNOSIS — Z51.81 THERAPEUTIC DRUG MONITORING: Primary | ICD-10-CM

## 2022-02-04 DIAGNOSIS — I87.009 POSTPHLEBITIC SYNDROME: ICD-10-CM

## 2022-02-04 LAB
INR PPP: 1.6 (ref 0.9–1.1)
PROTHROMBIN TIME: 19.6 SECONDS

## 2022-02-04 PROCEDURE — 36416 COLLJ CAPILLARY BLOOD SPEC: CPT | Performed by: INTERNAL MEDICINE

## 2022-02-04 PROCEDURE — 85610 PROTHROMBIN TIME: CPT | Performed by: INTERNAL MEDICINE

## 2022-02-18 ENCOUNTER — CLINICAL SUPPORT (OUTPATIENT)
Dept: INTERNAL MEDICINE | Facility: CLINIC | Age: 67
End: 2022-02-18

## 2022-02-18 DIAGNOSIS — Z79.01 ANTICOAGULATED ON COUMADIN: ICD-10-CM

## 2022-02-18 DIAGNOSIS — Z51.81 THERAPEUTIC DRUG MONITORING: Primary | ICD-10-CM

## 2022-02-18 LAB
INR PPP: 1.3 (ref 0.9–1.1)
PROTHROMBIN TIME: 15.7 SECONDS

## 2022-02-18 PROCEDURE — 85610 PROTHROMBIN TIME: CPT | Performed by: INTERNAL MEDICINE

## 2022-02-18 PROCEDURE — 36416 COLLJ CAPILLARY BLOOD SPEC: CPT | Performed by: INTERNAL MEDICINE

## 2022-02-21 ENCOUNTER — TELEPHONE (OUTPATIENT)
Dept: INTERNAL MEDICINE | Facility: CLINIC | Age: 67
End: 2022-02-21

## 2022-02-21 NOTE — TELEPHONE ENCOUNTER
Okay so it was reported to me that he was taking 2-1/2 tablets daily so yes this makes a huge difference if he is only taking 1/2 tablet daily.    So we will use confirm that he is taking one half of a 5 mg tablet once a day.  If that is so, tell him to increase to 1 whole tablet once a day and recheck in 2 weeks.

## 2022-02-21 NOTE — TELEPHONE ENCOUNTER
"Please confirm that he was taking 5 mg 2-1/2 tablets daily.  So his INR was basically in the \"normal range\" not therapeutic at all.  So I recommended doing 2-1/2 tablets every other day and 3 tablets every other day.  If he is uncomfortable with that, he could do 3 tablets twice a day and 2-1/2 tablets all other days and recheck in 2 weeks.  "

## 2022-02-21 NOTE — TELEPHONE ENCOUNTER
Caller: Joseph Lundberg    Relationship: Self    Best call back number: [phone]    What medications are you currently taking:   Current Outpatient Medications on File Prior to Visit   Medication Sig Dispense Refill   • allopurinol (ZYLOPRIM) 300 MG tablet TAKE 1 TABLET DAILY 90 tablet 1   • atorvastatin (LIPITOR) 40 MG tablet TAKE 1 TABLET DAILY 90 tablet 1   • Cetirizine HCl (ZYRTEC ALLERGY) 10 MG capsule Take 1 capsule by mouth Daily As Needed.     • famotidine (PEPCID) 20 MG tablet Take 1 tablet by mouth As Needed.     • sildenafil (Viagra) 100 MG tablet Take 1 tablet by mouth Daily As Needed for Erectile Dysfunction. 10 tablet 5   • sodium-potassium-magnesium sulfates (Suprep Bowel Prep Kit) 17.5-3.13-1.6 GM/177ML solution oral solution Take 1 bottle by mouth Take As Directed. Follow instructions that were mailed to your home. If you didn't receive these call [phone]. 354 mL 0   • Testosterone 30 MG/ACT solution APPLY 1 ACTUATION TO EACH UNDERARM DAILY 270 mL 1   • warfarin (Jantoven) 5 MG tablet TAKE 1 TABLET EVERY 5 days a week and 1-1/2 tablets 2 days a week (for instance on Wednesday and Saturday) 45 tablet 5     No current facility-administered medications on file prior to visit.              Which medication are you concerned about: WARFARIN     Who prescribed you this medication: DOCTOR MCCARTHY    What are your concerns: THE PATIENT STATES THAT HE FEELS THAT THE DOSAGE THAT HE WAS INCREASED TO IS TO HIGH

## 2022-02-21 NOTE — TELEPHONE ENCOUNTER
Called patient advised per note he stated early Feb. His # was 4.6 Na DfJinny Carter had him stop for 2 days then resume at 1/2 tablet daily that's what he has been on since and why he thinks the 3 tablets QD is too much

## 2022-02-22 NOTE — TELEPHONE ENCOUNTER
Confirmed with pt he is taking 1/2 of a 5 mg tab qd. He will increase to 1 - 5mg tab qd and repeat INR in 2 weeks

## 2022-03-08 ENCOUNTER — CLINICAL SUPPORT (OUTPATIENT)
Dept: INTERNAL MEDICINE | Facility: CLINIC | Age: 67
End: 2022-03-08

## 2022-03-08 DIAGNOSIS — Z51.81 THERAPEUTIC DRUG MONITORING: Primary | ICD-10-CM

## 2022-03-08 DIAGNOSIS — Z79.01 ANTICOAGULATED ON COUMADIN: ICD-10-CM

## 2022-03-08 LAB
INR PPP: 1.8 (ref 0.9–1.1)
PROTHROMBIN TIME: 21.6 SECONDS

## 2022-03-08 PROCEDURE — 85610 PROTHROMBIN TIME: CPT | Performed by: INTERNAL MEDICINE

## 2022-03-08 PROCEDURE — 36416 COLLJ CAPILLARY BLOOD SPEC: CPT | Performed by: INTERNAL MEDICINE

## 2022-04-05 RX ORDER — WARFARIN SODIUM 5 MG/1
TABLET ORAL
Qty: 45 TABLET | Refills: 5 | Status: SHIPPED | OUTPATIENT
Start: 2022-04-05 | End: 2022-10-17 | Stop reason: SDUPTHER

## 2022-04-05 NOTE — TELEPHONE ENCOUNTER
Rx Refill Note  Requested Prescriptions     Pending Prescriptions Disp Refills   • warfarin (Jantoven) 5 MG tablet [Pharmacy Med Name: JANTOVEN TAB 5MG] 45 tablet 5     Sig: TAKE 1 TABLET EVERY OTHER  DAY ALTERNATING WITH 1 AND 1/2 TABLETS EVERY OTHER    DAY      Last office visit with prescribing clinician: 10/29/2021      Next office visit with prescribing clinician: 5/25/2022            Maggie Odell RN  04/05/22, 10:14 EDT

## 2022-04-08 ENCOUNTER — CLINICAL SUPPORT (OUTPATIENT)
Dept: INTERNAL MEDICINE | Facility: CLINIC | Age: 67
End: 2022-04-08

## 2022-04-08 DIAGNOSIS — Z79.01 ANTICOAGULATED ON COUMADIN: ICD-10-CM

## 2022-04-08 DIAGNOSIS — Z51.81 THERAPEUTIC DRUG MONITORING: Primary | ICD-10-CM

## 2022-04-08 LAB
INR PPP: 2.7 (ref 0.9–1.1)
PROTHROMBIN TIME: 31.8 SECONDS

## 2022-04-08 PROCEDURE — 36416 COLLJ CAPILLARY BLOOD SPEC: CPT | Performed by: INTERNAL MEDICINE

## 2022-04-08 PROCEDURE — 85610 PROTHROMBIN TIME: CPT | Performed by: INTERNAL MEDICINE

## 2022-05-31 RX ORDER — ATORVASTATIN CALCIUM 40 MG/1
TABLET, FILM COATED ORAL
Qty: 90 TABLET | Refills: 1 | Status: SHIPPED | OUTPATIENT
Start: 2022-05-31 | End: 2022-11-23 | Stop reason: SDUPTHER

## 2022-05-31 RX ORDER — ALLOPURINOL 300 MG/1
TABLET ORAL
Qty: 90 TABLET | Refills: 1 | Status: SHIPPED | OUTPATIENT
Start: 2022-05-31 | End: 2022-11-23 | Stop reason: SDUPTHER

## 2022-05-31 NOTE — TELEPHONE ENCOUNTER
Rx Refill Note  Requested Prescriptions     Pending Prescriptions Disp Refills   • atorvastatin (LIPITOR) 40 MG tablet [Pharmacy Med Name: ATORVASTATIN TAB 40MG] 90 tablet 1     Sig: TAKE 1 TABLET DAILY   • allopurinol (ZYLOPRIM) 300 MG tablet [Pharmacy Med Name: ALLOPURINOL  TAB 300MG] 90 tablet 1     Sig: TAKE 1 TABLET DAILY      Last office visit with prescribing clinician: 10/29/2021      Next office visit with prescribing clinician: 6/1/2022            Leslee Herndon LPN  05/31/22, 11:08 EDT

## 2022-06-01 ENCOUNTER — OFFICE VISIT (OUTPATIENT)
Dept: INTERNAL MEDICINE | Facility: CLINIC | Age: 67
End: 2022-06-01

## 2022-06-01 VITALS
TEMPERATURE: 97.8 F | SYSTOLIC BLOOD PRESSURE: 128 MMHG | HEIGHT: 76 IN | DIASTOLIC BLOOD PRESSURE: 84 MMHG | HEART RATE: 68 BPM | BODY MASS INDEX: 30.47 KG/M2 | WEIGHT: 250.2 LBS

## 2022-06-01 DIAGNOSIS — E78.2 MIXED HYPERLIPIDEMIA: Primary | ICD-10-CM

## 2022-06-01 DIAGNOSIS — D68.59 HYPERCOAGULABLE STATE: ICD-10-CM

## 2022-06-01 DIAGNOSIS — Z51.81 THERAPEUTIC DRUG MONITORING: ICD-10-CM

## 2022-06-01 DIAGNOSIS — E29.1 MALE HYPOGONADISM: ICD-10-CM

## 2022-06-01 LAB
INR PPP: 2.5 (ref 0.9–1.1)
PROTHROMBIN TIME: 29.8 SECONDS

## 2022-06-01 PROCEDURE — 85610 PROTHROMBIN TIME: CPT | Performed by: INTERNAL MEDICINE

## 2022-06-01 PROCEDURE — 99214 OFFICE O/P EST MOD 30 MIN: CPT | Performed by: INTERNAL MEDICINE

## 2022-06-01 PROCEDURE — 36416 COLLJ CAPILLARY BLOOD SPEC: CPT | Performed by: INTERNAL MEDICINE

## 2022-06-01 NOTE — PROGRESS NOTES
[facility]     Joseph Lundberg  1955   [id number]      Patient Care Team:  Jairo Carter MD as PCP - General (Internal Medicine)  Kadeem Olson MD as Consulting Physician (Gastroenterology)    Chief Complaint::   Chief Complaint   Patient presents with   • Hypertension        HPI    The patient comes in for follow-up of hypertension, hyperlipidemia, hypogonadism, and hypercoagulable state.    Hypertension  The patient states he is doing well physically. He denies any issues with his legs. He denies any breathing trouble.    Hypogonadism  The patient states he is good on his prescriptions.    Weight loss  The patient states he has lost 5 pounds.    Chronic Conditions:      Patient Active Problem List   Diagnosis   • AR (allergic rhinitis)   • Primary hypertension   • Dyspnea on exertion   • GERD (gastroesophageal reflux disease)   • Hypercoagulable state (HCC)   • HLD (hyperlipidemia)   • Male hypogonadism   • Mild persistent asthma   • Postphlebitic syndrome   • Pulmonary embolism (HCC)   • Therapeutic drug monitoring   • Anticoagulated on warfarin        Past Medical History:   Diagnosis Date   • Achilles tendon injury    • Nephrolithiasis      (uric acid/calcium)   • Pulmonary embolism (HCC) 2011   • Pulmonary embolism (HCC) 2002    DVT   • Pulmonary embolism (HCC) 2010    DVT    • Torn meniscus 2002    left    • Torn meniscus 2008    left       Past Surgical History:   Procedure Laterality Date   • ACHILLES TENDON REPAIR  2010   • FL CYSTO URETHROSCOPY W REM FB     • KNEE ARTHROSCOPY W/ MENISCAL REPAIR Left 2008   • KNEE ARTHROSCOPY W/ MENISCAL REPAIR Left 2002   • TONSILLECTOMY     • VASECTOMY  1994       Family History   Problem Relation Age of Onset   • Heart attack Mother 73   • Heart disease Mother    • Heart attack Father 28        and 62   • Heart disease Father    • Coronary artery disease Brother 43        premature    • Heart disease Brother    • Heart attack Paternal  "Uncle 61   • Heart disease Paternal Uncle    • Kidney disease Paternal Uncle    • Stroke Maternal Grandmother        Social History     Socioeconomic History   • Marital status:    Tobacco Use   • Smoking status: Never Smoker   • Smokeless tobacco: Never Used   Substance and Sexual Activity   • Alcohol use: Yes     Alcohol/week: 1.0 standard drink     Types: 1 Cans of beer per week   • Drug use: Never   • Sexual activity: Never       Allergies   Allergen Reactions   • Penicillins Unknown (See Comments)     Unknown         Current Outpatient Medications:   •  allopurinol (ZYLOPRIM) 300 MG tablet, TAKE 1 TABLET DAILY, Disp: 90 tablet, Rfl: 1  •  atorvastatin (LIPITOR) 40 MG tablet, TAKE 1 TABLET DAILY, Disp: 90 tablet, Rfl: 1  •  Cetirizine HCl 10 MG capsule, Take 1 capsule by mouth Daily As Needed., Disp: , Rfl:   •  famotidine (PEPCID) 20 MG tablet, Take 1 tablet by mouth As Needed., Disp: , Rfl:   •  sildenafil (Viagra) 100 MG tablet, Take 1 tablet by mouth Daily As Needed for Erectile Dysfunction., Disp: 10 tablet, Rfl: 5  •  Testosterone 30 MG/ACT solution, APPLY 1 ACTUATION TO EACH UNDERARM DAILY, Disp: 270 mL, Rfl: 1  •  warfarin (Jantoven) 5 MG tablet, TAKE 1 TABLET EVERY OTHER  DAY ALTERNATING WITH 1 AND 1/2 TABLETS EVERY OTHER    DAY (Patient taking differently: Take 5 mg by mouth Daily.), Disp: 45 tablet, Rfl: 5    Review of Systems     A review of systems was performed, and positive findings are noted in the HPI.    Vital Signs  Vitals:    06/01/22 1507   BP: 128/84   BP Location: Left arm   Patient Position: Sitting   Cuff Size: Large Adult   Pulse: 68   Temp: 97.8 °F (36.6 °C)   Weight: 113 kg (250 lb 3.2 oz)   Height: 191.8 cm (75.51\")   PainSc: 0-No pain       Physical Exam  Vitals reviewed.   Constitutional:       Appearance: He is well-developed.   HENT:      Head: Normocephalic and atraumatic.   Cardiovascular:      Rate and Rhythm: Normal rate and regular rhythm.      Heart sounds: Normal " heart sounds. No murmur heard.  Pulmonary:      Effort: Pulmonary effort is normal.      Breath sounds: Normal breath sounds.   Neurological:      Mental Status: He is alert and oriented to person, place, and time.          Procedures    ACE III MINI             Assessment/Plan:    Diagnoses and all orders for this visit:    1. Mixed hyperlipidemia (Primary)    2. Therapeutic drug monitoring  -     POC Protime / INR    3. Hypercoagulable state (HCC)    4. Male hypogonadism        1. Hyperlipidemia  - Lipids are well controlled on atorvastatin. Plan to repeat yearly on return.    2. Hypercoagulable state  - He will continue warfarin. INR is therapeutic today.    3. Hypogonadism  - He will continue topical testosterone. Plan labs in the fall.    Follow up in 6 months.    Plan of care reviewed with patient at the conclusion of today's visit. Education was provided regarding diagnosis, management, and any prescribed or recommended OTC medications.Patient verbalizes understanding of and agreement with management plan.         Jairo Carter MD           Answers for HPI/ROS submitted by the patient on 5/27/2022  What is the primary reason for your visit?: Physical    Transcribed from ambient dictation for Jairo Carter MD by Adrian Smith.  06/01/22   16:07 EDT    Patient verbalized consent to the visit recording.

## 2022-06-06 DIAGNOSIS — E29.1 MALE HYPOGONADISM: ICD-10-CM

## 2022-06-06 RX ORDER — TESTOSTERONE 30 MG/1.5ML
SOLUTION TOPICAL
Qty: 270 ML | Refills: 2 | Status: SHIPPED | OUTPATIENT
Start: 2022-06-06

## 2022-06-06 NOTE — TELEPHONE ENCOUNTER
Rx Refill Note  Requested Prescriptions     Pending Prescriptions Disp Refills   • Testosterone 30 MG/ACT solution [Pharmacy Med Name: TESTOSTERONE 30 MG/1.5 ML PUMP] 270 mL      Sig: APPLY 1 ACTUATION TO EACH UNDERARM DAILY      Last office visit with prescribing clinician: 6/1/2022      Next office visit with prescribing clinician: 10/31/2022     LA: 09/07/21 #270 1R             Leslee Herndon LPN  06/06/22, 14:36 EDT

## 2022-07-13 ENCOUNTER — OFFICE VISIT (OUTPATIENT)
Dept: INTERNAL MEDICINE | Facility: CLINIC | Age: 67
End: 2022-07-13

## 2022-07-13 VITALS
OXYGEN SATURATION: 99 % | HEART RATE: 56 BPM | BODY MASS INDEX: 31.1 KG/M2 | WEIGHT: 252.2 LBS | SYSTOLIC BLOOD PRESSURE: 118 MMHG | DIASTOLIC BLOOD PRESSURE: 70 MMHG | RESPIRATION RATE: 18 BRPM

## 2022-07-13 DIAGNOSIS — D68.59 HYPERCOAGULABLE STATE: ICD-10-CM

## 2022-07-13 DIAGNOSIS — M54.50 ACUTE RIGHT-SIDED LOW BACK PAIN WITHOUT SCIATICA: Primary | ICD-10-CM

## 2022-07-13 LAB
INR PPP: 2.9 (ref 0.9–1.1)
PROTHROMBIN TIME: 35.1 SECONDS

## 2022-07-13 PROCEDURE — 85610 PROTHROMBIN TIME: CPT | Performed by: INTERNAL MEDICINE

## 2022-07-13 PROCEDURE — 36416 COLLJ CAPILLARY BLOOD SPEC: CPT | Performed by: INTERNAL MEDICINE

## 2022-07-13 PROCEDURE — 99213 OFFICE O/P EST LOW 20 MIN: CPT | Performed by: INTERNAL MEDICINE

## 2022-07-13 RX ORDER — CYCLOBENZAPRINE HCL 10 MG
10 TABLET ORAL 2 TIMES DAILY PRN
Qty: 30 TABLET | Refills: 0 | Status: SHIPPED | OUTPATIENT
Start: 2022-07-13

## 2022-07-13 RX ORDER — ACETAMINOPHEN 325 MG/1
650 TABLET ORAL EVERY 6 HOURS PRN
COMMUNITY

## 2022-07-13 NOTE — PROGRESS NOTES
Joseph Lundberg  1955  [id number]  Patient Care Team:  Jairo Carter MD as PCP - General (Internal Medicine)  Kadeem Olson MD as Consulting Physician (Gastroenterology)    Joseph Lundberg is a pleasant 66 y.o. male who presents for evaluation of Back Pain (Spasms after lifting from chair)    Chief Complaint   Patient presents with   • Back Pain     Spasms after lifting from chair       HPI:     The patient comes in complaining of back pain.    Back pain  The patient reports approximately 3 weeks ago, when he stood up from a chair,  he started having back pain. He states he is unsure if his pain is due to the position in which he was sitting in the chair. He reports his back pain is not getting better; he is still getting spasms and tenderness across his mid back on the right side. If he sits for a period of time then gets up, reaches for something, or walks down a hallway his pain is aggravated. He denies any radiation of pain or weakness in his bilateral lower extremities. He reports that applying heat to his back helps alleviate pain, but only for a short amount of time. He has taken Tylenol.    Past Medical History:   Diagnosis Date   • Achilles tendon injury    • Nephrolithiasis      (uric acid/calcium)   • Pulmonary embolism ([facility]) 2011   • Pulmonary embolism ([facility]) 2002    DVT   • Pulmonary embolism ([facility]) 2010    DVT    • Torn meniscus 2002    left    • Torn meniscus 2008    left     Past Surgical History:   Procedure Laterality Date   • ACHILLES TENDON REPAIR  2010   • FL CYSTO URETHROSCOPY W REM FB     • KNEE ARTHROSCOPY W/ MENISCAL REPAIR Left 2008   • KNEE ARTHROSCOPY W/ MENISCAL REPAIR Left 2002   • TONSILLECTOMY     • VASECTOMY  1994     Family History   Problem Relation Age of Onset   • Heart attack Mother 73   • Heart disease Mother    • Heart attack Father 28        and 62   • Heart disease Father    • Coronary artery disease Brother 43        premature    • Heart disease Brother     • Heart attack Paternal Uncle 61   • Heart disease Paternal Uncle    • Kidney disease Paternal Uncle    • Stroke Maternal Grandmother      Social History     Tobacco Use   Smoking Status Never Smoker   Smokeless Tobacco Never Used     Allergies   Allergen Reactions   • Penicillins Unknown (See Comments)     Unknown       Current Outpatient Medications:   •  acetaminophen (TYLENOL) 325 MG tablet, Take 650 mg by mouth Every 6 (Six) Hours As Needed for Mild Pain ., Disp: , Rfl:   •  allopurinol (ZYLOPRIM) 300 MG tablet, TAKE 1 TABLET DAILY, Disp: 90 tablet, Rfl: 1  •  atorvastatin (LIPITOR) 40 MG tablet, TAKE 1 TABLET DAILY, Disp: 90 tablet, Rfl: 1  •  Cetirizine HCl 10 MG capsule, Take 1 capsule by mouth Daily As Needed., Disp: , Rfl:   •  famotidine (PEPCID) 20 MG tablet, Take 1 tablet by mouth As Needed., Disp: , Rfl:   •  sildenafil (Viagra) 100 MG tablet, Take 1 tablet by mouth Daily As Needed for Erectile Dysfunction., Disp: 10 tablet, Rfl: 5  •  Testosterone 30 MG/ACT solution, APPLY 1 ACTUATION TO EACH UNDERARM DAILY, Disp: 270 mL, Rfl: 2  •  warfarin (Jantoven) 5 MG tablet, TAKE 1 TABLET EVERY OTHER  DAY ALTERNATING WITH 1 AND 1/2 TABLETS EVERY OTHER    DAY (Patient taking differently: Take 5 mg by mouth Daily.), Disp: 45 tablet, Rfl: 5  •  cyclobenzaprine (FLEXERIL) 10 MG tablet, Take 1 tablet by mouth 2 (Two) Times a Day As Needed for Muscle Spasms., Disp: 30 tablet, Rfl: 0    Review of Systems   Constitutional: Negative for chills, fatigue and fever.   HENT: Negative for congestion, ear pain and sinus pressure.    Respiratory: Negative for cough, chest tightness, shortness of breath and wheezing.    Cardiovascular: Negative for chest pain and palpitations.   Gastrointestinal: Negative for abdominal pain, blood in stool and constipation.   Skin: Negative for color change.   Allergic/Immunologic: Negative for environmental allergies.   Neurological: Negative for dizziness, speech difficulty and headache.    Psychiatric/Behavioral: Negative for decreased concentration. The patient is not nervous/anxious.      Review of systems was completed, and pertinent findings are noted in the HPI.  /70   Pulse 56   Resp 18   Wt 114 kg (252 lb 3.2 oz)   SpO2 99%   BMI 31.10 kg/m²     Physical Exam  Vitals reviewed.   Constitutional:       Appearance: He is well-developed.   HENT:      Head: Normocephalic and atraumatic.   Cardiovascular:      Rate and Rhythm: Normal rate and regular rhythm.      Heart sounds: Normal heart sounds. No murmur heard.  Pulmonary:      Effort: Pulmonary effort is normal.      Breath sounds: Normal breath sounds.   Musculoskeletal:      Comments: Significant tenderness in the right low back lateral to the paraspinous muscles in the lower rib cage. The overlying skin is normal.   Neurological:      Mental Status: He is alert and oriented to person, place, and time.         Procedures    PHQ-9 Total Score:      Assessment/Plan:  Diagnoses and all orders for this visit:    1. Acute right-sided low back pain without sciatica (Primary)    2. Hypercoagulable state (HCC)  -     POC Protime / INR    Other orders  -     cyclobenzaprine (FLEXERIL) 10 MG tablet; Take 1 tablet by mouth 2 (Two) Times a Day As Needed for Muscle Spasms.  Dispense: 30 tablet; Refill: 0       1. Low back pain secondary to muscle strain  - Because of warfarin therapy, he is unable to take NSAIDs. He is instructed to alternate ice with heat several times a day. He is given cyclobenzaprine to take in addition to acetaminophen 1000 mg every 8 hours. We discussed the possibility of sedation from cyclobenzaprine. Given the fact that he is now into the third week of this injury, if he is not improving in the next few days, I would recommend physical therapy. He will follow up as previously scheduled.    There are no Patient Instructions on file for this visit.  Plan of care reviewed with patient at the conclusion of today's visit.  Education was provided regarding diagnosis, management and any prescribed or recommended OTC medications.  Patient verbalizes understanding of and agreement with management plan.    No follow-ups on file.    Dictated Utilizing Dragon Dictation.    Jairo Carter MD          Answers for HPI/ROS submitted by the patient on 7/10/2022  What is the primary reason for your visit?: Back Pain  Chronicity: new  Onset: 1 to 4 weeks ago  Frequency: constantly  Progression since onset: waxing and waning  Pain location: lumbar spine  Pain quality: cramping, shooting, stabbing  Radiates to: does not radiate  Pain - numeric: 6/10  Pain is: worse during the day  Aggravated by: bending, coughing, position, sitting, twisting  Stiffness is present: all day      Transcribed from ambient dictation for Jairo Carter MD by Miriam Shelby.  07/13/22   09:27 EDT    Patient verbalized consent to the visit recording.

## 2022-07-14 ENCOUNTER — TELEPHONE (OUTPATIENT)
Dept: INTERNAL MEDICINE | Facility: CLINIC | Age: 67
End: 2022-07-14

## 2022-07-14 NOTE — TELEPHONE ENCOUNTER
----- Message from Jairo Carter MD sent at 7/14/2022  8:22 AM EDT -----  Same dose, repeat 1 month

## 2022-09-08 ENCOUNTER — CLINICAL SUPPORT (OUTPATIENT)
Dept: INTERNAL MEDICINE | Facility: CLINIC | Age: 67
End: 2022-09-08

## 2022-09-08 DIAGNOSIS — Z79.01 ANTICOAGULATED ON COUMADIN: ICD-10-CM

## 2022-09-08 DIAGNOSIS — Z51.81 THERAPEUTIC DRUG MONITORING: Primary | ICD-10-CM

## 2022-09-08 LAB
INR PPP: 3.9 (ref 0.9–1.1)
PROTHROMBIN TIME: 47.2 SECONDS

## 2022-09-08 PROCEDURE — 93793 ANTICOAG MGMT PT WARFARIN: CPT | Performed by: INTERNAL MEDICINE

## 2022-09-08 PROCEDURE — 36416 COLLJ CAPILLARY BLOOD SPEC: CPT | Performed by: INTERNAL MEDICINE

## 2022-09-08 PROCEDURE — 85610 PROTHROMBIN TIME: CPT | Performed by: INTERNAL MEDICINE

## 2022-09-26 ENCOUNTER — TELEPHONE (OUTPATIENT)
Dept: INTERNAL MEDICINE | Facility: CLINIC | Age: 67
End: 2022-09-26

## 2022-09-26 ENCOUNTER — CLINICAL SUPPORT (OUTPATIENT)
Dept: INTERNAL MEDICINE | Facility: CLINIC | Age: 67
End: 2022-09-26

## 2022-09-26 DIAGNOSIS — Z51.81 THERAPEUTIC DRUG MONITORING: Primary | ICD-10-CM

## 2022-09-26 DIAGNOSIS — Z79.01 ANTICOAGULATED ON COUMADIN: ICD-10-CM

## 2022-09-26 LAB
INR PPP: 3 (ref 0.9–1.1)
PROTHROMBIN TIME: 36.1 SECONDS

## 2022-09-26 PROCEDURE — 85610 PROTHROMBIN TIME: CPT | Performed by: INTERNAL MEDICINE

## 2022-09-26 PROCEDURE — 93793 ANTICOAG MGMT PT WARFARIN: CPT | Performed by: INTERNAL MEDICINE

## 2022-09-26 PROCEDURE — 36416 COLLJ CAPILLARY BLOOD SPEC: CPT | Performed by: INTERNAL MEDICINE

## 2022-09-26 NOTE — TELEPHONE ENCOUNTER
----- Message from Steve Wan MD sent at 9/26/2022 11:54 AM EDT -----  Continue on same dose warfarin 2.5 mg Friday and Monday, 5 mg other days, repeat INR in 3 weeks.

## 2022-09-27 ENCOUNTER — TELEPHONE (OUTPATIENT)
Dept: INTERNAL MEDICINE | Facility: CLINIC | Age: 67
End: 2022-09-27

## 2022-10-17 RX ORDER — WARFARIN SODIUM 5 MG/1
5 TABLET ORAL DAILY
Qty: 30 TABLET | Refills: 5 | Status: SHIPPED | OUTPATIENT
Start: 2022-10-17 | End: 2022-10-25

## 2022-10-17 NOTE — TELEPHONE ENCOUNTER
Rx Refill Note  Requested Prescriptions     Pending Prescriptions Disp Refills   • warfarin (Jantoven) 5 MG tablet       Sig: Take 1 tablet by mouth Daily.      Last office visit with prescribing clinician: 7/13/2022      Next office visit with prescribing clinician: 10/31/2022            Jo Villalobos LPN  10/17/22, 16:12 EDT

## 2022-10-25 ENCOUNTER — CLINICAL SUPPORT (OUTPATIENT)
Dept: INTERNAL MEDICINE | Facility: CLINIC | Age: 67
End: 2022-10-25

## 2022-10-25 DIAGNOSIS — Z51.81 THERAPEUTIC DRUG MONITORING: Primary | ICD-10-CM

## 2022-10-25 DIAGNOSIS — Z79.01 ANTICOAGULATED ON COUMADIN: ICD-10-CM

## 2022-10-25 LAB — INR PPP: 2.4 (ref 0.9–1.1)

## 2022-10-25 PROCEDURE — 85610 PROTHROMBIN TIME: CPT | Performed by: INTERNAL MEDICINE

## 2022-10-25 PROCEDURE — 36416 COLLJ CAPILLARY BLOOD SPEC: CPT | Performed by: INTERNAL MEDICINE

## 2022-10-25 RX ORDER — WARFARIN SODIUM 5 MG/1
TABLET ORAL
Qty: 30 TABLET | Refills: 5
Start: 2022-10-25

## 2022-10-31 ENCOUNTER — LAB (OUTPATIENT)
Dept: LAB | Facility: HOSPITAL | Age: 67
End: 2022-10-31

## 2022-10-31 ENCOUNTER — OFFICE VISIT (OUTPATIENT)
Dept: INTERNAL MEDICINE | Facility: CLINIC | Age: 67
End: 2022-10-31

## 2022-10-31 VITALS
DIASTOLIC BLOOD PRESSURE: 82 MMHG | HEIGHT: 76 IN | SYSTOLIC BLOOD PRESSURE: 120 MMHG | HEART RATE: 76 BPM | TEMPERATURE: 98.2 F | WEIGHT: 246 LBS | BODY MASS INDEX: 29.96 KG/M2

## 2022-10-31 DIAGNOSIS — K21.9 GASTROESOPHAGEAL REFLUX DISEASE WITHOUT ESOPHAGITIS: ICD-10-CM

## 2022-10-31 DIAGNOSIS — E29.1 MALE HYPOGONADISM: ICD-10-CM

## 2022-10-31 DIAGNOSIS — E78.2 MIXED HYPERLIPIDEMIA: ICD-10-CM

## 2022-10-31 DIAGNOSIS — I10 PRIMARY HYPERTENSION: ICD-10-CM

## 2022-10-31 DIAGNOSIS — I87.009 POSTPHLEBITIC SYNDROME: ICD-10-CM

## 2022-10-31 DIAGNOSIS — Z12.5 PROSTATE CANCER SCREENING: ICD-10-CM

## 2022-10-31 DIAGNOSIS — Z00.00 PREVENTATIVE HEALTH CARE: Primary | ICD-10-CM

## 2022-10-31 LAB
ALBUMIN SERPL-MCNC: 5.2 G/DL (ref 3.5–5.2)
ALBUMIN UR-MCNC: <1.2 MG/DL
ALBUMIN/GLOB SERPL: 1.7 G/DL
ALP SERPL-CCNC: 119 U/L (ref 39–117)
ALT SERPL W P-5'-P-CCNC: 21 U/L (ref 1–41)
ANION GAP SERPL CALCULATED.3IONS-SCNC: 9.1 MMOL/L (ref 5–15)
AST SERPL-CCNC: 21 U/L (ref 1–40)
BILIRUB SERPL-MCNC: 1.4 MG/DL (ref 0–1.2)
BUN SERPL-MCNC: 19 MG/DL (ref 8–23)
BUN/CREAT SERPL: 12.8 (ref 7–25)
CALCIUM SPEC-SCNC: 10.3 MG/DL (ref 8.6–10.5)
CHLORIDE SERPL-SCNC: 101 MMOL/L (ref 98–107)
CHOLEST SERPL-MCNC: 153 MG/DL (ref 0–200)
CO2 SERPL-SCNC: 28.9 MMOL/L (ref 22–29)
CREAT SERPL-MCNC: 1.49 MG/DL (ref 0.76–1.27)
CREAT UR-MCNC: 172.6 MG/DL
EGFRCR SERPLBLD CKD-EPI 2021: 52.1 ML/MIN/1.73
GLOBULIN UR ELPH-MCNC: 3.1 GM/DL
GLUCOSE SERPL-MCNC: 95 MG/DL (ref 65–99)
HDLC SERPL-MCNC: 36 MG/DL (ref 40–60)
LDLC SERPL CALC-MCNC: 99 MG/DL (ref 0–100)
LDLC/HDLC SERPL: 2.7 {RATIO}
MICROALBUMIN/CREAT UR: NORMAL MG/G{CREAT}
POTASSIUM SERPL-SCNC: 4.2 MMOL/L (ref 3.5–5.2)
PROT SERPL-MCNC: 8.3 G/DL (ref 6–8.5)
PSA SERPL-MCNC: 1.91 NG/ML (ref 0–4)
SODIUM SERPL-SCNC: 139 MMOL/L (ref 136–145)
TESTOST SERPL-MCNC: 268 NG/DL (ref 193–740)
TRIGL SERPL-MCNC: 99 MG/DL (ref 0–150)
VLDLC SERPL-MCNC: 18 MG/DL (ref 5–40)

## 2022-10-31 PROCEDURE — G0402 INITIAL PREVENTIVE EXAM: HCPCS | Performed by: INTERNAL MEDICINE

## 2022-10-31 PROCEDURE — 1159F MED LIST DOCD IN RCRD: CPT | Performed by: INTERNAL MEDICINE

## 2022-10-31 PROCEDURE — 80061 LIPID PANEL: CPT

## 2022-10-31 PROCEDURE — 82043 UR ALBUMIN QUANTITATIVE: CPT

## 2022-10-31 PROCEDURE — 82570 ASSAY OF URINE CREATININE: CPT

## 2022-10-31 PROCEDURE — 85025 COMPLETE CBC W/AUTO DIFF WBC: CPT

## 2022-10-31 PROCEDURE — 1170F FXNL STATUS ASSESSED: CPT | Performed by: INTERNAL MEDICINE

## 2022-10-31 PROCEDURE — 84403 ASSAY OF TOTAL TESTOSTERONE: CPT

## 2022-10-31 PROCEDURE — 80053 COMPREHEN METABOLIC PANEL: CPT

## 2022-10-31 PROCEDURE — G0103 PSA SCREENING: HCPCS

## 2022-10-31 RX ORDER — KETOCONAZOLE 20 MG/G
1 CREAM TOPICAL DAILY
Qty: 30 G | Refills: 0 | Status: SHIPPED | OUTPATIENT
Start: 2022-10-31

## 2022-11-01 LAB
BASOPHILS # BLD AUTO: 0.06 10*3/MM3 (ref 0–0.2)
BASOPHILS NFR BLD AUTO: 0.9 % (ref 0–1.5)
DEPRECATED RDW RBC AUTO: 47.9 FL (ref 37–54)
EOSINOPHIL # BLD AUTO: 0.1 10*3/MM3 (ref 0–0.4)
EOSINOPHIL NFR BLD AUTO: 1.5 % (ref 0.3–6.2)
ERYTHROCYTE [DISTWIDTH] IN BLOOD BY AUTOMATED COUNT: 14.6 % (ref 12.3–15.4)
HCT VFR BLD AUTO: 51.5 % (ref 37.5–51)
HGB BLD-MCNC: 17.5 G/DL (ref 13–17.7)
IMM GRANULOCYTES # BLD AUTO: 0.01 10*3/MM3 (ref 0–0.05)
IMM GRANULOCYTES NFR BLD AUTO: 0.2 % (ref 0–0.5)
LYMPHOCYTES # BLD AUTO: 1.61 10*3/MM3 (ref 0.7–3.1)
LYMPHOCYTES NFR BLD AUTO: 24.5 % (ref 19.6–45.3)
MCH RBC QN AUTO: 30.8 PG (ref 26.6–33)
MCHC RBC AUTO-ENTMCNC: 34 G/DL (ref 31.5–35.7)
MCV RBC AUTO: 90.5 FL (ref 79–97)
MONOCYTES # BLD AUTO: 0.39 10*3/MM3 (ref 0.1–0.9)
MONOCYTES NFR BLD AUTO: 5.9 % (ref 5–12)
NEUTROPHILS NFR BLD AUTO: 4.4 10*3/MM3 (ref 1.7–7)
NEUTROPHILS NFR BLD AUTO: 67 % (ref 42.7–76)
NRBC BLD AUTO-RTO: 0 /100 WBC (ref 0–0.2)
PLATELET # BLD AUTO: 163 10*3/MM3 (ref 140–450)
PMV BLD AUTO: 10.7 FL (ref 6–12)
RBC # BLD AUTO: 5.69 10*6/MM3 (ref 4.14–5.8)
WBC NRBC COR # BLD: 6.57 10*3/MM3 (ref 3.4–10.8)

## 2022-11-15 ENCOUNTER — TELEPHONE (OUTPATIENT)
Dept: INTERNAL MEDICINE | Facility: CLINIC | Age: 67
End: 2022-11-15

## 2022-11-15 NOTE — TELEPHONE ENCOUNTER
Caller: Joseph Lundberg    Relationship: Self    Best call back number: [phone]    What is the best time to reach you: ANYTIME     Who are you requesting to speak with (clinical staff, provider,  specific staff member): CLINICAL STAFF     What was the call regarding: PATIENT IS CALLING TO LET THE PROVIDER KNOW THAT THE OINTMENT,ENT THAT HE WAS PRESCRIBED IS NOT WORKING AS THEY HAD HOPED. HE WANTS TO KNOW IF HE CAN TRY THE ORAL MEDICATION OR HAVE THE REFERRAL THAT THEY HAD DISCUSSED     Do you require a callback:  YES

## 2022-11-16 RX ORDER — TERBINAFINE HYDROCHLORIDE 250 MG/1
250 TABLET ORAL DAILY
Qty: 14 TABLET | Refills: 0 | Status: SHIPPED | OUTPATIENT
Start: 2022-11-16 | End: 2023-05-26

## 2022-11-16 NOTE — TELEPHONE ENCOUNTER
I sent terbinafine, an oral antifungal, to [facility].  If not better after 2 weeks, will need to see dermatology.

## 2022-11-18 ENCOUNTER — FLU SHOT (OUTPATIENT)
Dept: INTERNAL MEDICINE | Facility: CLINIC | Age: 67
End: 2022-11-18

## 2022-11-18 DIAGNOSIS — Z23 NEED FOR INFLUENZA VACCINATION: Primary | ICD-10-CM

## 2022-11-18 PROCEDURE — G0008 ADMIN INFLUENZA VIRUS VAC: HCPCS | Performed by: INTERNAL MEDICINE

## 2022-11-18 PROCEDURE — 90662 IIV NO PRSV INCREASED AG IM: CPT | Performed by: INTERNAL MEDICINE

## 2022-11-23 RX ORDER — ATORVASTATIN CALCIUM 40 MG/1
40 TABLET, FILM COATED ORAL DAILY
Qty: 90 TABLET | Refills: 1 | Status: SHIPPED | OUTPATIENT
Start: 2022-11-23

## 2022-11-23 RX ORDER — ALLOPURINOL 300 MG/1
300 TABLET ORAL DAILY
Qty: 90 TABLET | Refills: 1 | Status: SHIPPED | OUTPATIENT
Start: 2022-11-23

## 2022-11-30 ENCOUNTER — TELEPHONE (OUTPATIENT)
Dept: INTERNAL MEDICINE | Facility: CLINIC | Age: 67
End: 2022-11-30

## 2022-11-30 DIAGNOSIS — R21 RASH OF GENITAL AREA: Primary | ICD-10-CM

## 2022-11-30 NOTE — TELEPHONE ENCOUNTER
Caller: Joseph Lundberg    Relationship: Self    Best call back number: [phone]    What is the best time to reach you: ANYTIME     Who are you requesting to speak with (clinical staff, provider,  specific staff member): CLINICAL STAFF     What was the call regarding: PATIENT STATES THAT THE LAST PRESCRIPTION DID NOT WORK. HE WOULD LIKE  TO GET A REFERRAL TO A DERMATOLOGY. HE IS REQUESTING TO NOT BE SEEN BY [facility].     Do you require a callback: YES

## 2022-12-21 ENCOUNTER — CLINICAL SUPPORT (OUTPATIENT)
Dept: INTERNAL MEDICINE | Facility: CLINIC | Age: 67
End: 2022-12-21

## 2022-12-21 DIAGNOSIS — Z51.81 THERAPEUTIC DRUG MONITORING: Primary | ICD-10-CM

## 2022-12-21 DIAGNOSIS — Z79.01 ANTICOAGULATED ON WARFARIN: ICD-10-CM

## 2022-12-21 LAB
INR PPP: 2.1 (ref 0.9–1.1)
PROTHROMBIN TIME: 25.1 SECONDS

## 2022-12-21 PROCEDURE — 85610 PROTHROMBIN TIME: CPT | Performed by: INTERNAL MEDICINE

## 2022-12-21 PROCEDURE — 36416 COLLJ CAPILLARY BLOOD SPEC: CPT | Performed by: INTERNAL MEDICINE

## 2023-04-04 ENCOUNTER — CLINICAL SUPPORT (OUTPATIENT)
Dept: INTERNAL MEDICINE | Facility: CLINIC | Age: 68
End: 2023-04-04
Payer: MEDICARE

## 2023-04-04 DIAGNOSIS — Z51.81 THERAPEUTIC DRUG MONITORING: ICD-10-CM

## 2023-04-04 DIAGNOSIS — Z79.01 ANTICOAGULATED ON WARFARIN: Primary | ICD-10-CM

## 2023-04-04 LAB
INR PPP: 2.3 (ref 0.9–1.1)
PROTHROMBIN TIME: 27.9 SECONDS

## 2023-04-04 PROCEDURE — 85610 PROTHROMBIN TIME: CPT | Performed by: INTERNAL MEDICINE

## 2023-04-04 PROCEDURE — 36416 COLLJ CAPILLARY BLOOD SPEC: CPT | Performed by: INTERNAL MEDICINE

## 2023-04-10 RX ORDER — WARFARIN SODIUM 5 MG/1
TABLET ORAL
Qty: 30 TABLET | Refills: 5 | Status: SHIPPED | OUTPATIENT
Start: 2023-04-10

## 2023-04-10 NOTE — TELEPHONE ENCOUNTER
Rx Refill Note  Requested Prescriptions     Pending Prescriptions Disp Refills   • warfarin (Jantoven) 5 MG tablet [Pharmacy Med Name: JANTOVEN TAB 5MG] 30 tablet 5     Sig: TAKE 1 TABLET DAILY      Last office visit with prescribing clinician: 10/31/2022   Last telemedicine visit with prescribing clinician: 5/26/2023   Next office visit with prescribing clinician: 5/26/2023                         Would you like a call back once the refill request has been completed: [] Yes [] No    If the office needs to give you a call back, can they leave a voicemail: [] Yes [] No    Jo Villalobos LPN  04/10/23, 07:56 EDT

## 2023-05-08 RX ORDER — ALLOPURINOL 300 MG/1
TABLET ORAL
Qty: 90 TABLET | Refills: 1 | Status: SHIPPED | OUTPATIENT
Start: 2023-05-08

## 2023-05-08 RX ORDER — ATORVASTATIN CALCIUM 40 MG/1
TABLET, FILM COATED ORAL
Qty: 90 TABLET | Refills: 1 | Status: SHIPPED | OUTPATIENT
Start: 2023-05-08

## 2023-05-08 NOTE — TELEPHONE ENCOUNTER
Rx Refill Note  Requested Prescriptions     Pending Prescriptions Disp Refills   • allopurinol (ZYLOPRIM) 300 MG tablet [Pharmacy Med Name: ALLOPURINOL  TAB 300MG] 90 tablet 1     Sig: TAKE 1 TABLET DAILY   • atorvastatin (LIPITOR) 40 MG tablet [Pharmacy Med Name: ATORVASTATIN TAB 40MG] 90 tablet 1     Sig: TAKE 1 TABLET DAILY      Last office visit with prescribing clinician: 10/31/2022   Last telemedicine visit with prescribing clinician: 5/26/2023   Next office visit with prescribing clinician: 5/26/2023                         Would you like a call back once the refill request has been completed: [] Yes [] No    If the office needs to give you a call back, can they leave a voicemail: [] Yes [] No    Dain Duff MA  05/08/23, 09:38 EDT

## 2023-05-26 ENCOUNTER — LAB (OUTPATIENT)
Dept: LAB | Facility: HOSPITAL | Age: 68
End: 2023-05-26
Payer: MEDICARE

## 2023-05-26 ENCOUNTER — OFFICE VISIT (OUTPATIENT)
Dept: INTERNAL MEDICINE | Facility: CLINIC | Age: 68
End: 2023-05-26
Payer: MEDICARE

## 2023-05-26 VITALS
DIASTOLIC BLOOD PRESSURE: 76 MMHG | WEIGHT: 263 LBS | TEMPERATURE: 97.8 F | HEART RATE: 64 BPM | BODY MASS INDEX: 32.03 KG/M2 | SYSTOLIC BLOOD PRESSURE: 122 MMHG | HEIGHT: 76 IN

## 2023-05-26 DIAGNOSIS — M1A.09X0 IDIOPATHIC CHRONIC GOUT OF MULTIPLE SITES WITHOUT TOPHUS: ICD-10-CM

## 2023-05-26 DIAGNOSIS — D68.59 HYPERCOAGULABLE STATE: ICD-10-CM

## 2023-05-26 DIAGNOSIS — E29.1 MALE HYPOGONADISM: ICD-10-CM

## 2023-05-26 DIAGNOSIS — I10 PRIMARY HYPERTENSION: Primary | ICD-10-CM

## 2023-05-26 DIAGNOSIS — E78.2 MIXED HYPERLIPIDEMIA: ICD-10-CM

## 2023-05-26 DIAGNOSIS — Z79.01 ANTICOAGULATED ON WARFARIN: ICD-10-CM

## 2023-05-26 LAB
ALBUMIN SERPL-MCNC: 4.6 G/DL (ref 3.5–5.2)
ALBUMIN/GLOB SERPL: 1.5 G/DL
ALP SERPL-CCNC: 117 U/L (ref 39–117)
ALT SERPL W P-5'-P-CCNC: 21 U/L (ref 1–41)
ANION GAP SERPL CALCULATED.3IONS-SCNC: 10 MMOL/L (ref 5–15)
AST SERPL-CCNC: 25 U/L (ref 1–40)
BILIRUB SERPL-MCNC: 1.4 MG/DL (ref 0–1.2)
BUN SERPL-MCNC: 13 MG/DL (ref 8–23)
BUN/CREAT SERPL: 9.7 (ref 7–25)
CALCIUM SPEC-SCNC: 9.3 MG/DL (ref 8.6–10.5)
CHLORIDE SERPL-SCNC: 101 MMOL/L (ref 98–107)
CHOLEST SERPL-MCNC: 139 MG/DL (ref 0–200)
CO2 SERPL-SCNC: 27 MMOL/L (ref 22–29)
CREAT SERPL-MCNC: 1.34 MG/DL (ref 0.76–1.27)
EGFRCR SERPLBLD CKD-EPI 2021: 58.8 ML/MIN/1.73
GLOBULIN UR ELPH-MCNC: 3 GM/DL
GLUCOSE SERPL-MCNC: 94 MG/DL (ref 65–99)
HDLC SERPL-MCNC: 31 MG/DL (ref 40–60)
INR PPP: 1.9 (ref 0.9–1.1)
LDLC SERPL CALC-MCNC: 90 MG/DL (ref 0–100)
LDLC/HDLC SERPL: 2.89 {RATIO}
POTASSIUM SERPL-SCNC: 4.5 MMOL/L (ref 3.5–5.2)
PROT SERPL-MCNC: 7.6 G/DL (ref 6–8.5)
PROTHROMBIN TIME: 30 SECONDS
SODIUM SERPL-SCNC: 138 MMOL/L (ref 136–145)
TESTOST SERPL-MCNC: 412 NG/DL (ref 193–740)
TRIGL SERPL-MCNC: 92 MG/DL (ref 0–150)
URATE SERPL-MCNC: 5.5 MG/DL (ref 3.4–7)
VLDLC SERPL-MCNC: 18 MG/DL (ref 5–40)

## 2023-05-26 PROCEDURE — 80061 LIPID PANEL: CPT

## 2023-05-26 PROCEDURE — 84403 ASSAY OF TOTAL TESTOSTERONE: CPT

## 2023-05-26 PROCEDURE — 84550 ASSAY OF BLOOD/URIC ACID: CPT

## 2023-05-26 PROCEDURE — 80053 COMPREHEN METABOLIC PANEL: CPT

## 2023-05-26 RX ORDER — TESTOSTERONE 30 MG/1.5ML
SOLUTION TOPICAL
Qty: 270 ML | Refills: 2 | Status: CANCELLED | OUTPATIENT
Start: 2023-05-26

## 2023-05-26 RX ORDER — TESTOSTERONE 30 MG/1.5ML
SOLUTION TOPICAL
Qty: 270 ML | Refills: 2 | Status: SHIPPED | OUTPATIENT
Start: 2023-05-26

## 2023-05-26 NOTE — PROGRESS NOTES
[facility]     Joseph Lundberg  1955   [id number]      Patient Care Team:  Jairo Carter MD as PCP - General (Internal Medicine)  Kadeem Olson MD as Consulting Physician (Gastroenterology)    Chief Complaint::   Chief Complaint   Patient presents with   • Hypertension   • Hyperlipidemia        HPI  The patient comes in for a follow-up of hypertension, hyperlipidemia, and hypercoagulable state.    General health maintenance  The patient is doing well physically. He has good levels of energy. He denies any shortness of breath or chest pains. Both his lower extremities are doing well. He wears compression stockings. He is fasting today. He is due for pneumococcal vaccination.     Hypercoagulable state  The patient's INR today is 1.9.     Hypertension  The patient's blood pressure is within normal limits today.    Gout  The patient denies any gout symptoms. Since he started taking allopurinol, he has not had any kidney stones.    Hypogonadism  The patient has been taking testosterone supplements 1 pump to each arm daily. It helped his sexual function and erection. He used to experience erectile dysfunction and fatigue.     Chronic Conditions: Hypertension, hyperlipidemia, hypercoagulable state.    Patient Active Problem List   Diagnosis   • AR (allergic rhinitis)   • Primary hypertension   • Dyspnea on exertion   • GERD (gastroesophageal reflux disease)   • Hypercoagulable state   • HLD (hyperlipidemia)   • Male hypogonadism   • Mild persistent asthma   • Postphlebitic syndrome   • Pulmonary embolism   • Therapeutic drug monitoring   • Anticoagulated on warfarin        Past Medical History:   Diagnosis Date   • Achilles tendon injury    • Allergic    • Colon polyp    • Hyperlipidemia    • Nephrolithiasis      (uric acid/calcium)   • Pulmonary embolism 2011   • Pulmonary embolism 2002    DVT   • Pulmonary embolism 2010    DVT    • Torn meniscus 2002    left    • Torn meniscus 2008     left       Past Surgical History:   Procedure Laterality Date   • ACHILLES TENDON REPAIR  2010   • FL CYSTO URETHROSCOPY W REM FB     • KNEE ARTHROSCOPY W/ MENISCAL REPAIR Left 2008   • KNEE ARTHROSCOPY W/ MENISCAL REPAIR Left 2002   • TONSILLECTOMY     • VASECTOMY  1994       Family History   Problem Relation Age of Onset   • Heart attack Mother 73   • Heart disease Mother    • Arthritis Mother    • Heart attack Father 28        and 62   • Heart disease Father    • Coronary artery disease Brother 43        premature    • Heart disease Brother    • Heart attack Paternal Uncle 61   • Heart disease Paternal Uncle    • Kidney disease Paternal Uncle    • Stroke Maternal Grandmother    • Arthritis Maternal Grandmother        Social History     Socioeconomic History   • Marital status:    Tobacco Use   • Smoking status: Never   • Smokeless tobacco: Never   Substance and Sexual Activity   • Alcohol use: Yes     Alcohol/week: 1.0 standard drink     Types: 1 Cans of beer per week   • Drug use: Never   • Sexual activity: Never       Allergies   Allergen Reactions   • Penicillins Unknown (See Comments)     Unknown         Current Outpatient Medications:   •  allopurinol (ZYLOPRIM) 300 MG tablet, TAKE 1 TABLET DAILY, Disp: 90 tablet, Rfl: 1  •  atorvastatin (LIPITOR) 40 MG tablet, TAKE 1 TABLET DAILY, Disp: 90 tablet, Rfl: 1  •  Cetirizine HCl 10 MG capsule, Take 1 capsule by mouth Daily As Needed., Disp: , Rfl:   •  famotidine (PEPCID) 20 MG tablet, Take 1 tablet by mouth As Needed., Disp: , Rfl:   •  sildenafil (Viagra) 100 MG tablet, Take 1 tablet by mouth Daily As Needed for Erectile Dysfunction., Disp: 10 tablet, Rfl: 5  •  Testosterone 30 MG/ACT solution, 1 pump to each arm daily, Disp: 270 mL, Rfl: 2  •  warfarin (Jantoven) 5 MG tablet, TAKE 1 TABLET DAILY, Disp: 30 tablet, Rfl: 5    Review of Systems   Constitutional: Negative for chills, fatigue and fever.   HENT: Negative for congestion, ear pain and sinus  "pressure.    Respiratory: Negative for cough, chest tightness, shortness of breath and wheezing.    Cardiovascular: Negative for chest pain and palpitations.   Gastrointestinal: Negative for abdominal pain, blood in stool and constipation.   Skin: Negative for color change.   Allergic/Immunologic: Negative for environmental allergies.   Neurological: Negative for dizziness, speech difficulty and headache.   Psychiatric/Behavioral: Negative for decreased concentration. The patient is not nervous/anxious.         Vital Signs  Vitals:    05/26/23 0845   BP: 122/76   BP Location: Left arm   Patient Position: Sitting   Cuff Size: Large Adult   Pulse: 64   Temp: 97.8 °F (36.6 °C)   Weight: 119 kg (263 lb)   Height: 193 cm (75.98\")   PainSc: 0-No pain       Physical Exam  Vitals reviewed.   Constitutional:       Appearance: He is well-developed.      Comments: He is overweight.   HENT:      Head: Normocephalic and atraumatic.   Eyes:      Pupils: Pupils are equal, round, and reactive to light.   Cardiovascular:      Rate and Rhythm: Normal rate and regular rhythm.      Heart sounds: Normal heart sounds.   Pulmonary:      Effort: Pulmonary effort is normal.      Breath sounds: Normal breath sounds.   Abdominal:      General: Bowel sounds are normal.      Palpations: Abdomen is soft.   Musculoskeletal:         General: Normal range of motion.      Cervical back: Normal range of motion.   Skin:     General: Skin is warm and dry.   Neurological:      Mental Status: He is alert and oriented to person, place, and time.          Procedures    ACE III MINI             Assessment/Plan:    Diagnoses and all orders for this visit:    1. Primary hypertension (Primary)    2. Male hypogonadism  -     Testosterone 30 MG/ACT solution; 1 pump to each arm daily  Dispense: 270 mL; Refill: 2  -     Testosterone; Future    3. Anticoagulated on warfarin  -     POC Protime / INR    4. Mixed hyperlipidemia  -     Comprehensive Metabolic Panel; " Future  -     Lipid Panel; Future    5. Hypercoagulable state    6. Idiopathic chronic gout of multiple sites without tophus  -     Uric Acid; Future        1. Hypertension  The patient's blood pressure is well controlled, currently on nonpharmacologic therapy. He will continue efforts at healthy diet and avoidance of weight gain.    2. Hypogonadism  The patient will continue topical testosterone. He continues to have an excellent symptomatic response.    3. Hyperlipidemia  The patient's lipid panel is pending. He will continue atorvastatin and healthy diet.    4. Hypercoagulable state  On lifelong warfarin. INR is therapeutic today. He will continue current dose.    The patient will follow up in 6 months. We discussed the pneumococcal vaccine. He would like to put this off until the fall.    Plan of care reviewed with patient at the conclusion of today's visit. Education was provided regarding diagnosis, management, and any prescribed or recommended OTC medications.Patient verbalizes understanding of and agreement with management plan.       Jairo Carter MD     Transcribed from ambient dictation for Jairo Carter MD by Beto Escamilla.  05/26/23   10:50 EDT    Patient or patient representative verbalized consent to the visit recording.  I have personally performed the services described in this document as transcribed by the above individual, and it is both accurate and complete.

## 2023-05-26 NOTE — PROGRESS NOTES
Capillary Blood Specimen Collection  Capillary blood collection performed in right hand by Penny Hui MA. Patient tolerated the procedure well without complications.   05/26/23   Penny Hui MA

## 2023-06-01 ENCOUNTER — PRIOR AUTHORIZATION (OUTPATIENT)
Dept: INTERNAL MEDICINE | Facility: CLINIC | Age: 68
End: 2023-06-01

## 2023-06-02 NOTE — TELEPHONE ENCOUNTER
PA approved 1/1/23 - 12/31/23. Called to [facility]  [phone]. Pharm states co-pay still $200 and they will have to order med - not avail until Mon. Advised pharm to let pt know

## 2023-09-08 ENCOUNTER — CLINICAL SUPPORT (OUTPATIENT)
Dept: INTERNAL MEDICINE | Facility: CLINIC | Age: 68
End: 2023-09-08
Payer: MEDICARE

## 2023-09-08 DIAGNOSIS — Z79.01 ANTICOAGULATED ON WARFARIN: ICD-10-CM

## 2023-09-08 DIAGNOSIS — Z51.81 THERAPEUTIC DRUG MONITORING: Primary | ICD-10-CM

## 2023-09-08 LAB
INR PPP: 2.1 (ref 0.9–1.1)
PROTHROMBIN TIME: 25.3 SECONDS

## 2023-09-08 PROCEDURE — 36416 COLLJ CAPILLARY BLOOD SPEC: CPT | Performed by: INTERNAL MEDICINE

## 2023-09-08 PROCEDURE — 85610 PROTHROMBIN TIME: CPT | Performed by: INTERNAL MEDICINE

## 2023-10-05 RX ORDER — WARFARIN SODIUM 5 MG/1
TABLET ORAL
Qty: 30 TABLET | Refills: 5 | Status: SHIPPED | OUTPATIENT
Start: 2023-10-05

## 2023-10-05 NOTE — TELEPHONE ENCOUNTER
Rx Refill Note  Requested Prescriptions     Pending Prescriptions Disp Refills    warfarin (Jantoven) 5 MG tablet [Pharmacy Med Name: JANTOVEN TAB 5MG] 30 tablet 5     Sig: TAKE 1 TABLET DAILY      Last office visit with prescribing clinician: 5/26/2023   Last telemedicine visit with prescribing clinician: Visit date not found   Next office visit with prescribing clinician: 1/10/2024                         Would you like a call back once the refill request has been completed: [] Yes [] No    If the office needs to give you a call back, can they leave a voicemail: [] Yes [] No    Jo Villalobos LPN  10/05/23, 08:20 EDT

## 2023-11-13 RX ORDER — ATORVASTATIN CALCIUM 40 MG/1
TABLET, FILM COATED ORAL
Qty: 90 TABLET | Refills: 1 | Status: SHIPPED | OUTPATIENT
Start: 2023-11-13

## 2023-11-13 RX ORDER — ALLOPURINOL 300 MG/1
TABLET ORAL
Qty: 90 TABLET | Refills: 1 | Status: SHIPPED | OUTPATIENT
Start: 2023-11-13

## 2023-11-16 ENCOUNTER — HOSPITAL ENCOUNTER (EMERGENCY)
Facility: HOSPITAL | Age: 68
Discharge: HOME | End: 2023-11-16
Attending: EMERGENCY MEDICINE
Payer: MEDICARE

## 2023-11-16 ENCOUNTER — APPOINTMENT (OUTPATIENT)
Dept: GENERAL RADIOLOGY | Facility: HOSPITAL | Age: 68
End: 2023-11-16
Payer: MEDICARE

## 2023-11-16 VITALS
HEIGHT: 75 IN | OXYGEN SATURATION: 100 % | SYSTOLIC BLOOD PRESSURE: 149 MMHG | DIASTOLIC BLOOD PRESSURE: 100 MMHG | WEIGHT: 250 LBS | HEART RATE: 68 BPM | RESPIRATION RATE: 20 BRPM | BODY MASS INDEX: 31.08 KG/M2 | TEMPERATURE: 97.6 F

## 2023-11-16 DIAGNOSIS — M25.562 ACUTE PAIN OF LEFT KNEE: Primary | ICD-10-CM

## 2023-11-16 DIAGNOSIS — M25.462 EFFUSION OF LEFT KNEE JOINT: ICD-10-CM

## 2023-11-16 PROCEDURE — 99283 EMERGENCY DEPT VISIT LOW MDM: CPT

## 2023-11-16 PROCEDURE — 73560 X-RAY EXAM OF KNEE 1 OR 2: CPT

## 2023-11-16 RX ORDER — HYDROCODONE BITARTRATE AND ACETAMINOPHEN 7.5; 325 MG/1; MG/1
1 TABLET ORAL ONCE
Status: COMPLETED | OUTPATIENT
Start: 2023-11-16 | End: 2023-11-16

## 2023-11-16 RX ADMIN — HYDROCODONE BITARTRATE AND ACETAMINOPHEN 1 TABLET: 7.5; 325 TABLET ORAL at 11:12

## 2023-11-16 NOTE — ED PROVIDER NOTES
Subjective   History of Present Illness  Pleasant patient who presents to the ER for left-sided knee pain that started while the patient was walking in a grocery store.  He felt a pop and felt his knee get stiff.  Does have a history of torn meniscus.  He follows Dr. Dangelo and actually has a follow-up with him next week.  Denies any numbness or tingling.  He denies any trauma.  Tells me does take warfarin and gets it checked regularly and reports that his levels have been okay.  Denies any trauma or fall.        Review of Systems    Past Medical History:   Diagnosis Date    Achilles tendon injury     Allergic     Colon polyp     Hyperlipidemia     Nephrolithiasis      (uric acid/calcium)    Pulmonary embolism 2011    Pulmonary embolism 2002    DVT    Pulmonary embolism 2010    DVT     Torn meniscus 2002    left     Torn meniscus 2008    left       Allergies   Allergen Reactions    Penicillins Unknown (See Comments)     Unknown       Past Surgical History:   Procedure Laterality Date    ACHILLES TENDON REPAIR  2010    FL CYSTO URETHROSCOPY W REM FB      KNEE ARTHROSCOPY W/ MENISCAL REPAIR Left 2008    KNEE ARTHROSCOPY W/ MENISCAL REPAIR Left 2002    TONSILLECTOMY      VASECTOMY  1994       Family History   Problem Relation Age of Onset    Heart attack Mother 73    Heart disease Mother     Arthritis Mother     Heart attack Father 28        and 62    Heart disease Father     Coronary artery disease Brother 43        premature     Heart disease Brother     Heart attack Paternal Uncle 61    Heart disease Paternal Uncle     Kidney disease Paternal Uncle     Stroke Maternal Grandmother     Arthritis Maternal Grandmother        Social History     Socioeconomic History    Marital status:    Tobacco Use    Smoking status: Never    Smokeless tobacco: Never   Substance and Sexual Activity    Alcohol use: Yes     Alcohol/week: 1.0 standard drink of alcohol     Types: 1 Cans of beer per week    Drug use: Never    Sexual  activity: Never           Objective   Physical Exam  Constitutional:       Appearance: He is well-developed.   HENT:      Head: Normocephalic and atraumatic.      Right Ear: External ear normal.      Left Ear: External ear normal.      Nose: Nose normal.   Eyes:      Conjunctiva/sclera: Conjunctivae normal.      Pupils: Pupils are equal, round, and reactive to light.   Cardiovascular:      Rate and Rhythm: Normal rate and regular rhythm.      Heart sounds: Normal heart sounds.   Pulmonary:      Effort: Pulmonary effort is normal.      Breath sounds: Normal breath sounds.   Abdominal:      General: Bowel sounds are normal.      Palpations: Abdomen is soft.   Musculoskeletal:      Cervical back: Normal range of motion and neck supple.      Comments: Painful limited range of motion of the left knee.  No obvious deformity.  Scant if any swelling.  Soft compartments good pulses.  Pain along the anterior medial aspect.  No evidence of cellulitis or erythema.   Skin:     General: Skin is warm and dry.   Neurological:      Mental Status: He is alert and oriented to person, place, and time.   Psychiatric:         Behavior: Behavior normal.         Judgment: Judgment normal.         Procedures           ED Course  ED Course as of 11/16/23 1110   Thu Nov 16, 2023   1110 Crutches.  Knee immobilizer.  Differential included meniscus ligament injury.  Less likely infection.  Patellofemoral syndrome etc.  We will give a dose of pain medicine here.  Otherwise Tylenol Motrin.  Gentle range of motion and massage.  Follow-up with Dr. Dangelo as scheduled. [SHREE]      ED Course User Index  [SHREE] Mulugeta Mccullough APRN                XR Knee 1 or 2 View Left   Final Result   Impression:   Small knee joint effusion without evidence of acute fracture or traumatic malalignment.         Electronically Signed: Mark Serna MD     11/16/2023 9:44 AM EST     Workstation ID: [id number]                                     Medical Decision Making  Problems  Addressed:  Acute pain of left knee: complicated acute illness or injury  Effusion of left knee joint: complicated acute illness or injury    Amount and/or Complexity of Data Reviewed  External Data Reviewed: radiology.  Radiology: ordered.    Risk  Prescription drug management.        Final diagnoses:   Acute pain of left knee   Effusion of left knee joint       ED Disposition  ED Disposition       ED Disposition   Discharge    Condition   Stable    Comment   --               Ben Dangelo MD  [address]  [address]  [place] [id number]  [phone]    Schedule an appointment as soon as possible for a visit       [facility] EMERGENCY DEPARTMENT  [address] [id number] [phone]    If symptoms worsen         Medication List      No changes were made to your prescriptions during this visit.            Mulugeta Mccullough, APRN  11/16/23 1110

## 2024-01-04 ENCOUNTER — PRIOR AUTHORIZATION (OUTPATIENT)
Dept: INTERNAL MEDICINE | Facility: CLINIC | Age: 69
End: 2024-01-04
Payer: COMMERCIAL

## 2024-01-04 NOTE — TELEPHONE ENCOUNTER
PA testosterone solution. Sent to [facility] [employer]. Key:  [id number] States PA not needed. Called pt and he said he has been unable to get med. He will call [email or web address]. He has appt here next week and will discuss then

## 2024-01-10 ENCOUNTER — OFFICE VISIT (OUTPATIENT)
Dept: INTERNAL MEDICINE | Facility: CLINIC | Age: 69
End: 2024-01-10
Payer: MEDICARE

## 2024-01-10 ENCOUNTER — TELEPHONE (OUTPATIENT)
Dept: INTERNAL MEDICINE | Facility: CLINIC | Age: 69
End: 2024-01-10

## 2024-01-10 VITALS
HEART RATE: 68 BPM | DIASTOLIC BLOOD PRESSURE: 82 MMHG | WEIGHT: 262.2 LBS | SYSTOLIC BLOOD PRESSURE: 138 MMHG | TEMPERATURE: 96.8 F | BODY MASS INDEX: 32.6 KG/M2 | HEIGHT: 75 IN

## 2024-01-10 DIAGNOSIS — R00.2 PALPITATIONS: ICD-10-CM

## 2024-01-10 DIAGNOSIS — E78.2 MIXED HYPERLIPIDEMIA: ICD-10-CM

## 2024-01-10 DIAGNOSIS — I10 PRIMARY HYPERTENSION: ICD-10-CM

## 2024-01-10 DIAGNOSIS — R73.09 ABNORMAL GLUCOSE: ICD-10-CM

## 2024-01-10 DIAGNOSIS — K21.9 GASTROESOPHAGEAL REFLUX DISEASE WITHOUT ESOPHAGITIS: ICD-10-CM

## 2024-01-10 DIAGNOSIS — Z00.00 PREVENTATIVE HEALTH CARE: Primary | ICD-10-CM

## 2024-01-10 DIAGNOSIS — Z79.01 ANTICOAGULATED ON WARFARIN: ICD-10-CM

## 2024-01-10 DIAGNOSIS — Z12.5 PROSTATE CANCER SCREENING: ICD-10-CM

## 2024-01-10 DIAGNOSIS — D68.59 HYPERCOAGULABLE STATE: ICD-10-CM

## 2024-01-10 DIAGNOSIS — E29.1 MALE HYPOGONADISM: ICD-10-CM

## 2024-01-10 LAB
INR PPP: 1.8 (ref 0.9–1.1)
PROTHROMBIN TIME: 33 SECONDS

## 2024-01-10 PROCEDURE — 3079F DIAST BP 80-89 MM HG: CPT | Performed by: INTERNAL MEDICINE

## 2024-01-10 PROCEDURE — 85610 PROTHROMBIN TIME: CPT | Performed by: INTERNAL MEDICINE

## 2024-01-10 PROCEDURE — 1159F MED LIST DOCD IN RCRD: CPT | Performed by: INTERNAL MEDICINE

## 2024-01-10 PROCEDURE — 1160F RVW MEDS BY RX/DR IN RCRD: CPT | Performed by: INTERNAL MEDICINE

## 2024-01-10 PROCEDURE — G0438 PPPS, INITIAL VISIT: HCPCS | Performed by: INTERNAL MEDICINE

## 2024-01-10 PROCEDURE — 3075F SYST BP GE 130 - 139MM HG: CPT | Performed by: INTERNAL MEDICINE

## 2024-01-10 PROCEDURE — 93000 ELECTROCARDIOGRAM COMPLETE: CPT | Performed by: INTERNAL MEDICINE

## 2024-01-10 PROCEDURE — 36416 COLLJ CAPILLARY BLOOD SPEC: CPT | Performed by: INTERNAL MEDICINE

## 2024-01-10 NOTE — PROGRESS NOTES
Capillary Blood Specimen Collection  Capillary blood collection performed in right hand by Penny Hui MA. Patient tolerated the procedure well without complications.   01/10/24   Penny Hui MA

## 2024-01-10 NOTE — PROGRESS NOTES
QUICK REFERENCE INFORMATION:  The ABCs of the Annual Wellness Visit    Initial Medicare Wellness Visit    HEALTH RISK ASSESSMENT    1955    Recent Hospitalizations:  No hospitalization(s) within the last year..        Current Medical Providers:  Patient Care Team:  Jairo Carter MD as PCP - General (Internal Medicine)  Kadeem Olson MD as Consulting Physician (Gastroenterology)        Smoking Status:  Social History     Tobacco Use   Smoking Status Never   Smokeless Tobacco Never       Alcohol Consumption:  Social History     Substance and Sexual Activity   Alcohol Use Not Currently    Comment: 1 drink monthly       Depression Screen:       1/10/2024     1:24 PM   PHQ-2/PHQ-9 Depression Screening   Little Interest or Pleasure in Doing Things 0-->not at all   Feeling Down, Depressed or Hopeless 0-->not at all   PHQ-9: Brief Depression Severity Measure Score 0       Health Habits and Functional and Cognitive Screenin/10/2024     1:20 PM   Functional & Cognitive Status   Do you have difficulty preparing food and eating? No   Do you have difficulty bathing yourself, getting dressed or grooming yourself? No   Do you have difficulty using the toilet? No   Do you have difficulty moving around from place to place? No   Do you have trouble with steps or getting out of a bed or a chair? No   Current Diet Well Balanced Diet   Dental Exam Up to date   Eye Exam Not up to date   Exercise (times per week) 4 times per week   Current Exercises Include Walking   Do you need help using the phone?  No   Are you deaf or do you have serious difficulty hearing?  No   Do you need help to go to places out of walking distance? No   Do you need help shopping? No   Do you need help preparing meals?  No   Do you need help with housework?  No   Do you need help with laundry? No   Do you need help taking your medications? No   Do you need help managing money? No   Do you ever drive or ride in a car without wearing a  "seat belt? No   Have you felt unusual stress, anger or loneliness in the last month? No   Who do you live with? Spouse   If you need help, do you have trouble finding someone available to you? No   Have you been bothered in the last four weeks by sexual problems? No   Do you have difficulty concentrating, remembering or making decisions? No       Fall Risk Screen:  BRANDON Fall Risk Assessment was completed, and patient is at LOW risk for falls.Assessment completed on:1/10/2024    ACE III MINI        Does the patient have evidence of cognitive impairment? No    Asiprin use counseling: Does not need ASA (and currently is not on it)      Recent Lab Results:  CMP:  Lab Results   Component Value Date    BUN 13 05/26/2023    CREATININE 1.34 (H) 05/26/2023    EGFRIFNONA 52 (L) 10/29/2021    BCR 9.7 05/26/2023     05/26/2023    K 4.5 05/26/2023    CO2 27.0 05/26/2023    CALCIUM 9.3 05/26/2023    ALBUMIN 4.6 05/26/2023    BILITOT 1.4 (H) 05/26/2023    ALKPHOS 117 05/26/2023    AST 25 05/26/2023    ALT 21 05/26/2023     HbA1c:  No results found for: \"HGBA1C\"  Microalbumin:  Lab Results   Component Value Date    MICROALBUR <1.2 10/31/2022     Lipid Panel  Lab Results   Component Value Date    CHOL 139 05/26/2023    TRIG 92 05/26/2023    HDL 31 (L) 05/26/2023    LDL 90 05/26/2023    AST 25 05/26/2023    ALT 21 05/26/2023       Visual Acuity:  No results found.    Age-appropriate Screening Schedule:  Refer to the list below for future screening recommendations based on patient's age, sex and/or medical conditions. Orders for these recommended tests are listed in the plan section. The patient has been provided with a written plan.    Health Maintenance   Topic Date Due    Pneumococcal Vaccine 65+ (1 of 2 - PCV) Never done    TDAP/TD VACCINES (1 - Tdap) Never done    INFLUENZA VACCINE  08/01/2023    COVID-19 Vaccine (4 - 2023-24 season) 09/01/2023    ANNUAL WELLNESS VISIT  10/31/2023    BMI FOLLOWUP  10/31/2023    LIPID " PANEL  05/26/2024    COLORECTAL CANCER SCREENING  11/16/2031    HEPATITIS C SCREENING  Completed    ZOSTER VACCINE  Completed        Subjective   History of Present Illness    Joseph Lundberg is a 68 y.o. male who presents for an Annual Wellness Visit.  The patient presents for initial Medicare physical and for follow-up of hypertension, hyperlipidemia, hypercoagulable state, and GERD.    Knee pain  The patient has been experiencing knee pain since 05/2023, before that he was walking 2 to 3 miles a day and was riding a bike. He had knee surgery in the past. He had an MRI in 09/2023 that revealed a ligament tear, this was treated with an intraarticular injection. He reported that in 11/2023, he stepped in the grocery store one day and experienced instability. He had another MRI done and was told that he had more tears. He is scheduled for another knee surgery by Dr. Gallagher on 01/30/2024.     Hypertension  The patient's blood pressure is controlled today. He denies dyspnea or chest pain.    Palpitations  The patient has recently noticed that every 6 weeks to 2 months, he develops an episode of tachycardia. He can feel his heart racing. On 01/07/2024, he was driving back from [place] and noticed his heart rate went over 100 bpm with no activity. He denies any chest pain. He adds this episode occurs intermittently.     CHRONIC CONDITIONS: Hypertension, hyperlipidemia, hypercoagulable state, and GERD.  CHRONIC CONDITIONS    The following portions of the patient's history were reviewed and updated as appropriate: allergies, current medications, past family history, past medical history, past social history, past surgical history, and problem list.    Outpatient Medications Prior to Visit   Medication Sig Dispense Refill    allopurinol (ZYLOPRIM) 300 MG tablet TAKE 1 TABLET DAILY 90 tablet 1    atorvastatin (LIPITOR) 40 MG tablet TAKE 1 TABLET DAILY 90 tablet 1    Cetirizine HCl 10 MG capsule Take 1 capsule by mouth Daily  As Needed.      famotidine (PEPCID) 20 MG tablet Take 1 tablet by mouth As Needed.      Testosterone 30 MG/ACT solution 1 pump to each arm daily 270 mL 2    warfarin (Jantoven) 5 MG tablet TAKE 1 TABLET DAILY (Patient taking differently: Take 1 tablet by mouth Every Night. Takes 5 mg Tues, Wed, Thurs, Sat and Sun and 2.5 mg Mon and Fri) 30 tablet 5    sildenafil (Viagra) 100 MG tablet Take 1 tablet by mouth Daily As Needed for Erectile Dysfunction. 10 tablet 5     No facility-administered medications prior to visit.       Patient Active Problem List   Diagnosis    AR (allergic rhinitis)    Primary hypertension    Dyspnea on exertion    GERD (gastroesophageal reflux disease)    Hypercoagulable state    HLD (hyperlipidemia)    Male hypogonadism    Mild persistent asthma    Postphlebitic syndrome    Pulmonary embolism    Therapeutic drug monitoring    Anticoagulated on warfarin       Advance Care Planning:  ACP discussion was held with the patient during this visit. Patient has an advance directive in EMR which is still valid.     Identification of Risk Factors:  Risk factors include: Advance Directive Discussion.    Review of Systems   Constitutional:  Negative for chills, fatigue and fever.   HENT:  Negative for congestion, ear pain and sinus pressure.    Respiratory:  Negative for cough, chest tightness, shortness of breath and wheezing.    Cardiovascular:  Negative for chest pain and palpitations.   Gastrointestinal:  Negative for abdominal pain, blood in stool and constipation.   Skin:  Negative for color change.   Allergic/Immunologic: Negative for environmental allergies.   Neurological:  Negative for dizziness, speech difficulty and headaches.   Psychiatric/Behavioral:  Negative for confusion. The patient is not nervous/anxious.        Compared to one year ago, the patient feels his physical health is the same.  Compared to one year ago, the patient feels his mental health is the same.    Objective     Physical  Exam  Vitals and nursing note reviewed.   Constitutional:       Appearance: He is well-developed.   HENT:      Head: Normocephalic and atraumatic.      Right Ear: External ear normal.      Left Ear: External ear normal.      Nose: Nose normal.      Mouth/Throat:      Pharynx: No oropharyngeal exudate.   Eyes:      Conjunctiva/sclera: Conjunctivae normal.      Pupils: Pupils are equal, round, and reactive to light.   Neck:      Thyroid: No thyromegaly.      Vascular: No JVD.   Cardiovascular:      Rate and Rhythm: Normal rate and regular rhythm.      Heart sounds: Normal heart sounds. No murmur heard.     No friction rub. No gallop.   Pulmonary:      Effort: Pulmonary effort is normal. No respiratory distress.      Breath sounds: Normal breath sounds. No wheezing or rales.   Chest:      Chest wall: No tenderness.   Abdominal:      General: Bowel sounds are normal. There is no distension.      Palpations: Abdomen is soft. There is no mass.      Tenderness: There is no abdominal tenderness. There is no guarding or rebound.      Hernia: No hernia is present.   Musculoskeletal:         General: No tenderness. Normal range of motion.      Cervical back: Normal range of motion and neck supple.   Lymphadenopathy:      Cervical: No cervical adenopathy.   Skin:     General: Skin is warm and dry.      Findings: No erythema or rash.      Comments: There is diffuse seborrheic keratoses on the trunk. No suspicious lesions are noticed.    Neurological:      Mental Status: He is alert and oriented to person, place, and time.      Cranial Nerves: No cranial nerve deficit.      Sensory: No sensory deficit.      Motor: No abnormal muscle tone.      Coordination: Coordination normal.      Deep Tendon Reflexes: Reflexes normal.   Psychiatric:         Behavior: Behavior normal.         Thought Content: Thought content normal.         Judgment: Judgment normal.            ECG 12 Lead    Date/Time: 1/10/2024 2:09 PM  Performed by:  "Jairo Carter MD    Authorized by: Jaior Carter MD  Comparison: not compared with previous ECG   Rhythm: sinus rhythm  Rate: normal  BPM: 63  Conduction: conduction normal  Q waves: III    ST Segments: ST segments normal  T Waves: T waves normal  QRS axis: normal  Other: no other findings    Clinical impression: non-specific ECG  Comments: Probably normal variant.           Vitals:    01/10/24 1327   BP: 138/82   BP Location: Left arm   Patient Position: Sitting   Cuff Size: Adult   Pulse: 68   Temp: 96.8 °F (36 °C)   Weight: 119 kg (262 lb 3.2 oz)   Height: 190.5 cm (75\")   PainSc:   5   PainLoc: Knee       BMI is >= 30 and <35. (Class 1 Obesity). The following options were offered after discussion;: exercise counseling/recommendations and nutrition counseling/recommendations      Assessment & Plan   Problem List Items Addressed This Visit       Primary hypertension    Relevant Orders    CBC & Differential    Microalbumin / Creatinine Urine Ratio - Urine, Clean Catch    XR Chest PA & Lateral    ECG 12 Lead    GERD (gastroesophageal reflux disease)    Relevant Medications    famotidine (PEPCID) 20 MG tablet    Hypercoagulable state    HLD (hyperlipidemia)    Relevant Medications    atorvastatin (LIPITOR) 40 MG tablet    Other Relevant Orders    Comprehensive Metabolic Panel    Lipid Panel    Male hypogonadism    Relevant Medications    Testosterone 30 MG/ACT solution    Other Relevant Orders    Testosterone    Anticoagulated on warfarin (Chronic)    Relevant Orders    POC Protime / INR (Completed)     Other Visit Diagnoses       Preventative health care    -  Primary    Prostate cancer screening        Relevant Orders    PSA Screen    Abnormal glucose        Relevant Orders    Hemoglobin A1c    Palpitations        Relevant Orders    Ambulatory Referral to Cardiology        1. Prevention  - Overall, he remains in good health. Hopefully, once he has his knee procedure, he can resume regular physical " "exercise including some strength training. He is up to date on colorectal cancer screening.    2. Hypertension  - Blood pressure is controlled on nonpharmacologic therapy.    3. Hyperlipidemia  - Lipid panel is pending. Continue atorvastatin and healthy diet.    4. Hypercoagulable state  - Continue lifelong warfarin.    5. GERD  - GERD is controlled on famotidine.    6. Hypogonadism  - Continue topical testosterone.     7. Palpitations  - Likely benign SVT. His EKG shows a Q wave in lead 3 and read as possible \"inferior myocardial infarction\". This is likely a normal variant, but we will go ahead and refer to cardiology for work-up of palpitations.    Follow up in 6 months.      Patient Self-Management and Personalized Health Advice  The patient has been provided with information about: diet, exercise, and weight management and preventive services including:   Annual Wellness Visit (AWV).    Outpatient Encounter Medications as of 1/10/2024   Medication Sig Dispense Refill    allopurinol (ZYLOPRIM) 300 MG tablet TAKE 1 TABLET DAILY 90 tablet 1    atorvastatin (LIPITOR) 40 MG tablet TAKE 1 TABLET DAILY 90 tablet 1    Cetirizine HCl 10 MG capsule Take 1 capsule by mouth Daily As Needed.      famotidine (PEPCID) 20 MG tablet Take 1 tablet by mouth As Needed.      Testosterone 30 MG/ACT solution 1 pump to each arm daily 270 mL 2    warfarin (Jantoven) 5 MG tablet TAKE 1 TABLET DAILY (Patient taking differently: Take 1 tablet by mouth Every Night. Takes 5 mg Tues, Wed, Thurs, Sat and Sun and 2.5 mg Mon and Fri) 30 tablet 5    [DISCONTINUED] sildenafil (Viagra) 100 MG tablet Take 1 tablet by mouth Daily As Needed for Erectile Dysfunction. 10 tablet 5     No facility-administered encounter medications on file as of 1/10/2024.       Reviewed use of high risk medication in the elderly: yes  Reviewed for potential of harmful drug interactions in the elderly: yes    Follow Up:  Return in about 6 months (around 7/10/2024) for " follow up.     There are no Patient Instructions on file for this visit.    An After Visit Summary and PPPS with all of these plans were given to the patient.         Transcribed from ambient dictation for Jairo Carter MD by Gris Davis .  01/10/24   17:17 EST    Patient or patient representative verbalized consent to the visit recording.  I have personally performed the services described in this document as transcribed by the above individual, and it is both accurate and complete.

## 2024-01-12 ENCOUNTER — HOSPITAL ENCOUNTER (OUTPATIENT)
Dept: CARDIOLOGY | Facility: HOSPITAL | Age: 69
Discharge: HOME | End: 2024-01-12
Payer: MEDICARE

## 2024-01-12 ENCOUNTER — OFFICE VISIT (OUTPATIENT)
Dept: CARDIOLOGY | Facility: HOSPITAL | Age: 69
End: 2024-01-12
Payer: MEDICARE

## 2024-01-12 VITALS
HEIGHT: 75 IN | OXYGEN SATURATION: 98 % | SYSTOLIC BLOOD PRESSURE: 136 MMHG | TEMPERATURE: 97.4 F | HEART RATE: 85 BPM | RESPIRATION RATE: 18 BRPM | BODY MASS INDEX: 32.53 KG/M2 | WEIGHT: 261.6 LBS | DIASTOLIC BLOOD PRESSURE: 86 MMHG

## 2024-01-12 DIAGNOSIS — E78.5 HYPERLIPIDEMIA, UNSPECIFIED HYPERLIPIDEMIA TYPE: ICD-10-CM

## 2024-01-12 DIAGNOSIS — R00.2 PALPITATIONS: Primary | ICD-10-CM

## 2024-01-12 DIAGNOSIS — R00.2 PALPITATIONS: ICD-10-CM

## 2024-01-12 DIAGNOSIS — I10 PRIMARY HYPERTENSION: ICD-10-CM

## 2024-01-12 PROCEDURE — 93242 EXT ECG>48HR<7D RECORDING: CPT

## 2024-01-12 NOTE — PROGRESS NOTES
"[facility], [facility]    Chief Complaint  Palpitations    Subjective    History of Present Illness {CC  Problem List  Visit  Diagnosis   Encounters  Notes  Medications  Labs  Result Review Imaging  Media :23}     Joseph Lundberg presents to [facility] for   History of Present Illness     68-year-old male with hypertension (life style mediations, no currents), hyperlipidemia, GERD, hypercoagulable state with hx of PE/DVT (chronic Coumadin therapy), hypogonadism, palpitations, CKD II, kidney stones.    Patient has had increased palpitations over the last 2 months.  Reports intermittent fast heart rate. Sudden on set of rapid heart rates. Duration 20-30 min.  No other associated symptoms.      Feels at baseline without concerns.     No CP or pressure, dyspnea.  Mild lighted ness with change in positions (baseline), no near syncope syncope.    Baseline edema since DVTS, wears compression socks.     Daughter with POTs.      Family hx of CAD:  Mother (86yo)/Father (29 yo)/brother (46yo)         Objective     Vital Signs:   Vitals:    01/12/24 1222 01/12/24 1223 01/12/24 1224   BP: 133/84 125/80 136/86   BP Location: Right arm Left arm Left arm   Patient Position: Sitting Standing Sitting   Cuff Size: Adult Adult Adult   Pulse: 69 93 85   Resp:   18   Temp: 97.4 °F (36.3 °C) 97.4 °F (36.3 °C) 97.4 °F (36.3 °C)   TempSrc: Temporal Temporal Temporal   SpO2: 99% 98% 98%   Weight:   119 kg (261 lb 9.6 oz)   Height:   190.5 cm (75\")     Body mass index is 32.7 kg/m².  Physical Exam  Vitals reviewed.   Constitutional:       General: He is not in acute distress.     Appearance: Normal appearance.   Cardiovascular:      Rate and Rhythm: Normal rate and regular rhythm.      Pulses:           Dorsalis pedis pulses are 2+ on the right side and 2+ on the left side.      Heart sounds: Normal heart sounds.   Pulmonary:      Effort: Pulmonary effort " "is normal.      Breath sounds: Normal breath sounds.   Musculoskeletal:      Right lower leg: No edema.      Left lower leg: No edema.   Skin:     General: Skin is warm and dry.   Neurological:      Mental Status: He is alert.   Psychiatric:         Mood and Affect: Mood normal.         Behavior: Behavior is cooperative.              Result Review  Data Reviewed:{ Labs  Result Review  Imaging  Med Tab  Media :23}   EKG completed on 1/10/2024: Not available in system for review.    Office Visit on 01/10/2024   Component Date Value Ref Range Status    Protime 01/10/2024 33.0  seconds Final    INR 01/10/2024 1.8 (A)  0.9 - 1.1 Final     Lab Results   Component Value Date    CHOL 139 05/26/2023    CHOL 153 10/31/2022    CHOL 141 10/29/2021     Lab Results   Component Value Date    TRIG 92 05/26/2023    TRIG 99 10/31/2022    TRIG 108 10/29/2021     Lab Results   Component Value Date    HDL 31 (L) 05/26/2023    HDL 36 (L) 10/31/2022    HDL 26 (L) 10/29/2021     Lab Results   Component Value Date    LDL 90 05/26/2023    LDL 99 10/31/2022    LDL 95 10/29/2021     No results found for: \"HGBA1C\"  Lab Results   Component Value Date    GLUCOSE 94 05/26/2023    BUN 13 05/26/2023    CREATININE 1.34 (H) 05/26/2023    EGFR 58.8 (L) 05/26/2023    BCR 9.7 05/26/2023    K 4.5 05/26/2023    CO2 27.0 05/26/2023    CALCIUM 9.3 05/26/2023    ALBUMIN 4.6 05/26/2023    BILITOT 1.4 (H) 05/26/2023    AST 25 05/26/2023    ALT 21 05/26/2023     Lab Results   Component Value Date    WBC 6.57 10/31/2022    HGB 17.5 10/31/2022    HCT 51.5 (H) 10/31/2022    MCV 90.5 10/31/2022     10/31/2022                   Assessment and Plan {CC Problem List  Visit Diagnosis  ROS  Review (Popup)  Health Maintenance  Quality  BestPractice  Medications  SmartSets  SnapShot Encounters  Media :23}   1. Palpitations    - Holter Monitor - 72 Hour Up To 15 Days; Future    2. History of hypertension  Off meds  Goal: average BP <120/80  BP not at " goal today  Will f/u next visit.  Continue to monitor    3. Hyperlipidemia, unspecified hyperlipidemia type  statin          Follow Up {Instructions Charge Capture  Follow-up Communications :23}   Return in about 6 weeks (around 2/23/2024), or if symptoms worsen or fail to improve, for Office visit, monitor results, palpitations.    Patient was given instructions and counseling regarding his condition or for health maintenance advice. Please see specific information pulled into the AVS if appropriate.  Patient was instructed to call the [facility] with any questions, concerns, or worsening symptoms.

## 2024-01-12 NOTE — PROGRESS NOTES
[facility] Heart Monitor Documentation    Joseph Lundberg  1955  [id number]  01/12/24      [] ZIO XT Patch  Model [id number] Prescribed for  Days    Serial Number: (N + 9 Digits) N   Apply-By Date on Box:   USPS Tracking Number:   USPS Tracking        [] [employer] MINI PLUS Mobile Cardiac Telemetry  Model BGMINIPLUS Prescribed for  Days    Serial Number: (BGM + 7 Digits) BGM  Shipped-By Date on Box:   UPS Tracking Number: [id number]  UPS Tracking      [x] [employer] MINI Holter Monitor  Model BGMINIEL Prescribed for 14 Days    Serial Number: (7 Digits) [id number]  Shipped-By Date on Box: 01/08/2024  UPS Tracking Number: [id number]  UPS Tracking        This monitor was applied to the patient's chest and checked for proper functioning.  Mr. Joseph Lundberg was instructed in the proper use of this monitor.  He was given the opportunity to ask questions and left the office with the device 's instruction manual.    Rina Beach CMA, 13:05 EST, 01/12/24                  [facility] 8[facility]

## 2024-01-18 ENCOUNTER — TELEPHONE (OUTPATIENT)
Dept: INTERNAL MEDICINE | Facility: CLINIC | Age: 69
End: 2024-01-18
Payer: COMMERCIAL

## 2024-01-18 RX ORDER — ENOXAPARIN SODIUM 100 MG/ML
60 INJECTION SUBCUTANEOUS EVERY 12 HOURS SCHEDULED
Qty: 4.8 ML | Refills: 0 | Status: SHIPPED | OUTPATIENT
Start: 2024-01-18

## 2024-01-18 NOTE — TELEPHONE ENCOUNTER
Please let pt know that Dr bo's office asked me to give him instructions regarding coumadin pre and post-op.      He should stop taking coumadin 5 days prior to surgery.      3 days prior to surgery he should begin lovenox, which is given by subcutaneous injection every 12 hours.  This is to prevent blood clots from forming while he is off coumadin.      Stop taking lovenox 24 hours prior to surgery.     Resume warfarin on the day of surgery.    The next day, resume lovenox, and stay on until 5 days after surgery, then he needs to come in for INR.     I will send rx for lovenox to his pharmacy.

## 2024-01-18 NOTE — TELEPHONE ENCOUNTER
I called patient.  He requested instructions be sent via [employer] message.    I copied and pasted these instructions into the message.  He knows to call/email with any questions.

## 2024-01-23 ENCOUNTER — HOSPITAL ENCOUNTER (OUTPATIENT)
Dept: GENERAL RADIOLOGY | Facility: HOSPITAL | Age: 69
Discharge: HOME | End: 2024-01-23
Payer: MEDICARE

## 2024-01-23 ENCOUNTER — LAB (OUTPATIENT)
Dept: LAB | Facility: HOSPITAL | Age: 69
End: 2024-01-23
Payer: MEDICARE

## 2024-01-23 DIAGNOSIS — I10 PRIMARY HYPERTENSION: ICD-10-CM

## 2024-01-23 DIAGNOSIS — E78.2 MIXED HYPERLIPIDEMIA: ICD-10-CM

## 2024-01-23 DIAGNOSIS — R73.09 ABNORMAL GLUCOSE: ICD-10-CM

## 2024-01-23 DIAGNOSIS — E29.1 MALE HYPOGONADISM: ICD-10-CM

## 2024-01-23 DIAGNOSIS — Z12.5 PROSTATE CANCER SCREENING: ICD-10-CM

## 2024-01-23 LAB
ALBUMIN SERPL-MCNC: 4 G/DL (ref 3.5–5.2)
ALBUMIN UR-MCNC: <1.2 MG/DL
ALBUMIN/GLOB SERPL: 1.3 G/DL
ALP SERPL-CCNC: 102 U/L (ref 39–117)
ALT SERPL W P-5'-P-CCNC: 29 U/L (ref 1–41)
ANION GAP SERPL CALCULATED.3IONS-SCNC: 8.4 MMOL/L (ref 5–15)
AST SERPL-CCNC: 21 U/L (ref 1–40)
BASOPHILS # BLD AUTO: 0.07 10*3/MM3 (ref 0–0.2)
BASOPHILS NFR BLD AUTO: 1.4 % (ref 0–1.5)
BILIRUB SERPL-MCNC: 1.2 MG/DL (ref 0–1.2)
BUN SERPL-MCNC: 11 MG/DL (ref 8–23)
BUN/CREAT SERPL: 8.6 (ref 7–25)
CALCIUM SPEC-SCNC: 9.5 MG/DL (ref 8.6–10.5)
CHLORIDE SERPL-SCNC: 103 MMOL/L (ref 98–107)
CHOLEST SERPL-MCNC: 146 MG/DL (ref 0–200)
CO2 SERPL-SCNC: 28.6 MMOL/L (ref 22–29)
CREAT SERPL-MCNC: 1.28 MG/DL (ref 0.76–1.27)
CREAT UR-MCNC: 146.2 MG/DL
DEPRECATED RDW RBC AUTO: 46.3 FL (ref 37–54)
EGFRCR SERPLBLD CKD-EPI 2021: 61.7 ML/MIN/1.73
EOSINOPHIL # BLD AUTO: 0.13 10*3/MM3 (ref 0–0.4)
EOSINOPHIL NFR BLD AUTO: 2.6 % (ref 0.3–6.2)
ERYTHROCYTE [DISTWIDTH] IN BLOOD BY AUTOMATED COUNT: 14.2 % (ref 12.3–15.4)
GLOBULIN UR ELPH-MCNC: 3.2 GM/DL
GLUCOSE SERPL-MCNC: 96 MG/DL (ref 65–99)
HBA1C MFR BLD: 5.8 % (ref 4.8–5.6)
HCT VFR BLD AUTO: 46.7 % (ref 37.5–51)
HDLC SERPL-MCNC: 33 MG/DL (ref 40–60)
HGB BLD-MCNC: 16.1 G/DL (ref 13–17.7)
IMM GRANULOCYTES # BLD AUTO: 0 10*3/MM3 (ref 0–0.05)
IMM GRANULOCYTES NFR BLD AUTO: 0 % (ref 0–0.5)
LDLC SERPL CALC-MCNC: 95 MG/DL (ref 0–100)
LDLC/HDLC SERPL: 2.85 {RATIO}
LYMPHOCYTES # BLD AUTO: 1.22 10*3/MM3 (ref 0.7–3.1)
LYMPHOCYTES NFR BLD AUTO: 24.1 % (ref 19.6–45.3)
MCH RBC QN AUTO: 31.1 PG (ref 26.6–33)
MCHC RBC AUTO-ENTMCNC: 34.5 G/DL (ref 31.5–35.7)
MCV RBC AUTO: 90.2 FL (ref 79–97)
MICROALBUMIN/CREAT UR: NORMAL MG/G{CREAT}
MONOCYTES # BLD AUTO: 0.29 10*3/MM3 (ref 0.1–0.9)
MONOCYTES NFR BLD AUTO: 5.7 % (ref 5–12)
NEUTROPHILS NFR BLD AUTO: 3.35 10*3/MM3 (ref 1.7–7)
NEUTROPHILS NFR BLD AUTO: 66.2 % (ref 42.7–76)
NRBC BLD AUTO-RTO: 0 /100 WBC (ref 0–0.2)
PLATELET # BLD AUTO: 154 10*3/MM3 (ref 140–450)
PMV BLD AUTO: 10.1 FL (ref 6–12)
POTASSIUM SERPL-SCNC: 4.1 MMOL/L (ref 3.5–5.2)
PROT SERPL-MCNC: 7.2 G/DL (ref 6–8.5)
PSA SERPL-MCNC: 2.25 NG/ML (ref 0–4)
RBC # BLD AUTO: 5.18 10*6/MM3 (ref 4.14–5.8)
SODIUM SERPL-SCNC: 140 MMOL/L (ref 136–145)
TESTOST SERPL-MCNC: 606 NG/DL (ref 193–740)
TRIGL SERPL-MCNC: 95 MG/DL (ref 0–150)
VLDLC SERPL-MCNC: 18 MG/DL (ref 5–40)
WBC NRBC COR # BLD AUTO: 5.06 10*3/MM3 (ref 3.4–10.8)

## 2024-01-23 PROCEDURE — 85025 COMPLETE CBC W/AUTO DIFF WBC: CPT

## 2024-01-23 PROCEDURE — 82570 ASSAY OF URINE CREATININE: CPT

## 2024-01-23 PROCEDURE — G0103 PSA SCREENING: HCPCS

## 2024-01-23 PROCEDURE — 80061 LIPID PANEL: CPT

## 2024-01-23 PROCEDURE — 84403 ASSAY OF TOTAL TESTOSTERONE: CPT

## 2024-01-23 PROCEDURE — 71046 X-RAY EXAM CHEST 2 VIEWS: CPT

## 2024-01-23 PROCEDURE — 80053 COMPREHEN METABOLIC PANEL: CPT

## 2024-01-23 PROCEDURE — 82043 UR ALBUMIN QUANTITATIVE: CPT

## 2024-01-23 PROCEDURE — 83036 HEMOGLOBIN GLYCOSYLATED A1C: CPT

## 2024-01-25 ENCOUNTER — TELEPHONE (OUTPATIENT)
Dept: INTERNAL MEDICINE | Facility: CLINIC | Age: 69
End: 2024-01-25
Payer: COMMERCIAL

## 2024-01-25 NOTE — TELEPHONE ENCOUNTER
Caller: Joseph Lundberg    Relationship: Self    Best call back number: [phone]      Who are you requesting to speak with (clinical staff, provider,  specific staff member): [facility]    What was the call regarding: PATIENT CALLED STATING THAT HE RECEIVED 8  INJECTIONS OF Enoxaparin Sodium (LOVENOX) 60 MG/0.6ML solution prefilled syringe syringe  BUT SHOULD HAVE 14 ACCORDING TO THE INSTRUCTIONS.

## 2024-01-26 RX ORDER — ENOXAPARIN SODIUM 100 MG/ML
60 INJECTION SUBCUTANEOUS EVERY 12 HOURS SCHEDULED
Qty: 4.8 ML | Refills: 0 | Status: CANCELLED | OUTPATIENT
Start: 2024-01-26

## 2024-01-26 NOTE — TELEPHONE ENCOUNTER
Caller: Joseph Lundberg    Relationship: Self    Best call back number: [phone]     Requested Prescriptions:   Requested Prescriptions     Pending Prescriptions Disp Refills    Enoxaparin Sodium (LOVENOX) 60 MG/0.6ML solution prefilled syringe syringe 4.8 mL 0     Sig: Inject 0.6 mL under the skin into the appropriate area as directed Every 12 (Twelve) Hours.        Pharmacy where request should be sent: [facility][id number] - [place], [place] - [address] [phone] [address] [facility] [phone]      Last office visit with prescribing clinician: 1/10/2024   Last telemedicine visit with prescribing clinician: Visit date not found   Next office visit with prescribing clinician: 7/12/2024     Additional details provided by patient: PATIENT STATES HE WAS SUPPOSED TO BE GIVEN 7 TO 8 DAYS WORTH OF THIS MEDICATION BUT HE HAS ONLY BEEN GIVEN 4 DAYS.     Does the patient have less than a 3 day supply:  [] Yes  [x] No    Would you like a call back once the refill request has been completed: [] Yes [x] No    If the office needs to give you a call back, can they leave a voicemail: [] Yes [x] No    Cadance Dunaway, RegSched Rep   01/26/24 16:06 EST

## 2024-02-05 ENCOUNTER — CLINICAL SUPPORT (OUTPATIENT)
Dept: INTERNAL MEDICINE | Facility: CLINIC | Age: 69
End: 2024-02-05
Payer: MEDICARE

## 2024-02-05 DIAGNOSIS — Z51.81 THERAPEUTIC DRUG MONITORING: ICD-10-CM

## 2024-02-05 DIAGNOSIS — Z79.01 ANTICOAGULATED ON WARFARIN: Primary | ICD-10-CM

## 2024-02-05 LAB
INR PPP: 1.3 (ref 0.9–1.1)
PROTHROMBIN TIME: 15.8 SECONDS

## 2024-02-05 PROCEDURE — 36416 COLLJ CAPILLARY BLOOD SPEC: CPT | Performed by: INTERNAL MEDICINE

## 2024-02-05 PROCEDURE — 85610 PROTHROMBIN TIME: CPT | Performed by: INTERNAL MEDICINE

## 2024-02-19 ENCOUNTER — CLINICAL SUPPORT (OUTPATIENT)
Dept: INTERNAL MEDICINE | Facility: CLINIC | Age: 69
End: 2024-02-19
Payer: MEDICARE

## 2024-02-19 DIAGNOSIS — Z51.81 THERAPEUTIC DRUG MONITORING: Primary | ICD-10-CM

## 2024-02-19 DIAGNOSIS — Z79.01 ANTICOAGULATED ON WARFARIN: ICD-10-CM

## 2024-02-19 LAB
INR PPP: 1.8 (ref 0.9–1.1)
PROTHROMBIN TIME: 21.8 SECONDS

## 2024-02-19 PROCEDURE — 85610 PROTHROMBIN TIME: CPT | Performed by: INTERNAL MEDICINE

## 2024-02-19 PROCEDURE — 36416 COLLJ CAPILLARY BLOOD SPEC: CPT | Performed by: INTERNAL MEDICINE

## 2024-02-21 ENCOUNTER — OFFICE VISIT (OUTPATIENT)
Dept: CARDIOLOGY | Facility: HOSPITAL | Age: 69
End: 2024-02-21
Payer: MEDICARE

## 2024-02-21 VITALS
HEIGHT: 75 IN | WEIGHT: 265 LBS | HEART RATE: 71 BPM | SYSTOLIC BLOOD PRESSURE: 136 MMHG | RESPIRATION RATE: 18 BRPM | TEMPERATURE: 96.7 F | DIASTOLIC BLOOD PRESSURE: 80 MMHG | OXYGEN SATURATION: 99 % | BODY MASS INDEX: 32.95 KG/M2

## 2024-02-21 DIAGNOSIS — I10 PRIMARY HYPERTENSION: ICD-10-CM

## 2024-02-21 DIAGNOSIS — E78.5 HYPERLIPIDEMIA, UNSPECIFIED HYPERLIPIDEMIA TYPE: ICD-10-CM

## 2024-02-21 DIAGNOSIS — R00.2 PALPITATIONS: Primary | ICD-10-CM

## 2024-02-21 NOTE — PROGRESS NOTES
"[facility], [facility]    Chief Complaint  Palpitations    Subjective    History of Present Illness {CC  Problem List  Visit  Diagnosis   Encounters  Notes  Medications  Labs  Result Review Imaging  Media :23}     Joseph Lundberg presents to [facility] CARDIOLOGY for   History of Present Illness     68-year-old male with hypertension (life style modifications, no current meds), hyperlipidemia, GERD, hypercoagulable state with hx of PE/DVT (chronic Coumadin therapy), hypogonadism, palpitations, CKD II, kidney stones.     Follow-up on palpitations.  Patient noted increased palpitations over the last several months.  Sudden onset of rapid rates.  Duration can last 20 to 30 minutes.  No other associated symptoms.    No CP or pressure, dyspnea.  Mild lighted ness with change in positions (baseline), no near syncope syncope.     Baseline edema since DVTS, wears compression socks.      Daughter with POTs.       Family hx of CAD:  Mother (84yo)/Father (29 yo)/brother (44yo)     Objective     Vital Signs:   Vitals:    02/21/24 0956   BP: 136/80   BP Location: Left arm   Patient Position: Sitting   Cuff Size: Adult   Pulse: 71   Resp: 18   Temp: 96.7 °F (35.9 °C)   TempSrc: Temporal   SpO2: 99%   Weight: 120 kg (265 lb)   Height: 190.5 cm (75\")     Body mass index is 33.12 kg/m².  Physical Exam  Vitals reviewed.   Constitutional:       General: He is not in acute distress.  Cardiovascular:      Rate and Rhythm: Normal rate and regular rhythm.   Pulmonary:      Effort: Pulmonary effort is normal.      Breath sounds: Normal breath sounds.   Skin:     Coloration: Skin is not pale.   Neurological:      Mental Status: He is alert.   Psychiatric:         Mood and Affect: Mood normal.         Behavior: Behavior normal. Behavior is cooperative.              Result Review  Data Reviewed:{ Labs  Result Review  Imaging  Med Tab  Media :23}   EKG 1/10/2024: " Sinus rhythm 63 bpm, nonspecific T wave abnormality.    14-day Holter 1/12/2024: Diagnostic data 8 days.  Average heart rate 65 bpm.  PACs and PVCs less than 1%. 25 episodes of SVT, longest duration 19 beats.  Controlled rate 60%, bradycardic 38%.    Lab Results   Component Value Date    CHOL 146 01/23/2024    CHOL 139 05/26/2023    CHOL 153 10/31/2022     Lab Results   Component Value Date    TRIG 95 01/23/2024    TRIG 92 05/26/2023    TRIG 99 10/31/2022     Lab Results   Component Value Date    HDL 33 (L) 01/23/2024    HDL 31 (L) 05/26/2023    HDL 36 (L) 10/31/2022     Lab Results   Component Value Date    LDL 95 01/23/2024    LDL 90 05/26/2023    LDL 99 10/31/2022     Lab Results   Component Value Date    WBC 5.06 01/23/2024    HGB 16.1 01/23/2024    HCT 46.7 01/23/2024    MCV 90.2 01/23/2024     01/23/2024     Lab Results   Component Value Date    GLUCOSE 96 01/23/2024    BUN 11 01/23/2024    CREATININE 1.28 (H) 01/23/2024    EGFR 61.7 01/23/2024    BCR 8.6 01/23/2024    K 4.1 01/23/2024    CO2 28.6 01/23/2024    CALCIUM 9.5 01/23/2024    ALBUMIN 4.0 01/23/2024    BILITOT 1.2 01/23/2024    AST 21 01/23/2024    ALT 29 01/23/2024     Lab Results   Component Value Date    HGBA1C 5.80 (H) 01/23/2024                   Assessment and Plan {CC Problem List  Visit Diagnosis  ROS  Review (Popup)  Health Maintenance  Quality  BestPractice  Medications  SmartSets  SnapShot Encounters  Media :23}   1. Palpitations  Frequent brief episodes of SVT.  Primarily asymptomatic except for feeling of palpitations.  Denies chest pain, pressure, dyspnea, dizziness, near-syncope, syncope.    Discussed possibilities of starting beta-blocker.    Patient wants to hold off at this time.  Decrease caffeine intake.  Increase physical activity and exercise.  - Adult Transthoracic Echo Complete W/ Cont if Necessary Per Protocol; Future    2. Primary hypertension  Blood pressure not at goal today.  Goal systolic blood  pressure less than 130, diastolic blood pressure less than 80    Patient prefers to keep a home blood pressure log for the next few weeks.  If blood pressure continues to be elevated and does not improve with lifestyle modifications and weight loss will consider antihypertensive medication.      - Adult Transthoracic Echo Complete W/ Cont if Necessary Per Protocol; Future    3. Hyperlipidemia, unspecified hyperlipidemia type  Continue to monitor.          Follow Up {Instructions Charge Capture  Follow-up Communications :23}   Return in about 3 months (around 5/21/2024), or if symptoms worsen or fail to improve, for HTN, palpitations.    Patient was given instructions and counseling regarding his condition or for health maintenance advice. Please see specific information pulled into the AVS if appropriate.  Patient was instructed to call the [facility] with any questions, concerns, or worsening symptoms.

## 2024-02-29 ENCOUNTER — HOSPITAL ENCOUNTER (OUTPATIENT)
Dept: CARDIOLOGY | Facility: HOSPITAL | Age: 69
Discharge: HOME | End: 2024-02-29
Admitting: NURSE PRACTITIONER
Payer: MEDICARE

## 2024-02-29 VITALS — HEIGHT: 75 IN | BODY MASS INDEX: 32.89 KG/M2 | WEIGHT: 264.55 LBS

## 2024-02-29 DIAGNOSIS — R00.2 PALPITATIONS: ICD-10-CM

## 2024-02-29 DIAGNOSIS — I10 PRIMARY HYPERTENSION: ICD-10-CM

## 2024-02-29 LAB
ASCENDING AORTA: 3.8 CM
BH CV ECHO MEAS - AO MAX PG: 5.8 MMHG
BH CV ECHO MEAS - AO MEAN PG: 3.2 MMHG
BH CV ECHO MEAS - AO ROOT DIAM: 3.7 CM
BH CV ECHO MEAS - AO V2 MAX: 120 CM/SEC
BH CV ECHO MEAS - AO V2 VTI: 25.1 CM
BH CV ECHO MEAS - AVA(I,D): 3.2 CM2
BH CV ECHO MEAS - EDV(CUBED): 154.3 ML
BH CV ECHO MEAS - EDV(MOD-SP2): 97 ML
BH CV ECHO MEAS - EDV(MOD-SP4): 87 ML
BH CV ECHO MEAS - EF(MOD-BP): 58 %
BH CV ECHO MEAS - EF(MOD-SP2): 57.7 %
BH CV ECHO MEAS - EF(MOD-SP4): 64.4 %
BH CV ECHO MEAS - ESV(CUBED): 66 ML
BH CV ECHO MEAS - ESV(MOD-SP2): 41 ML
BH CV ECHO MEAS - ESV(MOD-SP4): 31 ML
BH CV ECHO MEAS - FS: 24.7 %
BH CV ECHO MEAS - IVS/LVPW: 0.93 CM
BH CV ECHO MEAS - IVSD: 1.03 CM
BH CV ECHO MEAS - LA DIMENSION: 3.3 CM
BH CV ECHO MEAS - LAT PEAK E' VEL: 8.8 CM/SEC
BH CV ECHO MEAS - LV DIASTOLIC VOL/BSA (35-75): 35.2 CM2
BH CV ECHO MEAS - LV MASS(C)D: 225.1 GRAMS
BH CV ECHO MEAS - LV MAX PG: 2.7 MMHG
BH CV ECHO MEAS - LV MEAN PG: 1.52 MMHG
BH CV ECHO MEAS - LV SYSTOLIC VOL/BSA (12-30): 12.6 CM2
BH CV ECHO MEAS - LV V1 MAX: 82.6 CM/SEC
BH CV ECHO MEAS - LV V1 VTI: 17 CM
BH CV ECHO MEAS - LVIDD: 5.4 CM
BH CV ECHO MEAS - LVIDS: 4 CM
BH CV ECHO MEAS - LVOT AREA: 4.8 CM2
BH CV ECHO MEAS - LVOT DIAM: 2.46 CM
BH CV ECHO MEAS - LVPWD: 1.11 CM
BH CV ECHO MEAS - MED PEAK E' VEL: 8.9 CM/SEC
BH CV ECHO MEAS - MV A MAX VEL: 77.8 CM/SEC
BH CV ECHO MEAS - MV DEC TIME: 0.25 SEC
BH CV ECHO MEAS - MV E MAX VEL: 70.3 CM/SEC
BH CV ECHO MEAS - MV E/A: 0.9
BH CV ECHO MEAS - MV MAX PG: 3.2 MMHG
BH CV ECHO MEAS - MV MEAN PG: 1.49 MMHG
BH CV ECHO MEAS - MV V2 VTI: 27.5 CM
BH CV ECHO MEAS - MVA(VTI): 2.9 CM2
BH CV ECHO MEAS - PA ACC SLOPE: 358.2 CM/SEC2
BH CV ECHO MEAS - PA ACC TIME: 0.12 SEC
BH CV ECHO MEAS - PA V2 MAX: 79.1 CM/SEC
BH CV ECHO MEAS - PI END-D VEL: 118.7 CM/SEC
BH CV ECHO MEAS - SI(MOD-SP2): 22.7 ML/M2
BH CV ECHO MEAS - SI(MOD-SP4): 22.7 ML/M2
BH CV ECHO MEAS - SV(LVOT): 80.8 ML
BH CV ECHO MEAS - SV(MOD-SP2): 56 ML
BH CV ECHO MEAS - SV(MOD-SP4): 56 ML
BH CV ECHO MEAS - TAPSE (>1.6): 2.5 CM
BH CV ECHO MEASUREMENTS AVERAGE E/E' RATIO: 7.94
BH CV VAS BP RIGHT ARM: NORMAL MMHG
BH CV XLRA - RV BASE: 3.7 CM
BH CV XLRA - RV LENGTH: 8.3 CM
BH CV XLRA - RV MID: 3.2 CM
BH CV XLRA - TDI S': 12.2 CM/SEC
IVRT: 109 MS
LEFT ATRIUM VOLUME INDEX: 19.4 ML/M2
LV EF 2D ECHO EST: 55 %

## 2024-02-29 PROCEDURE — 93306 TTE W/DOPPLER COMPLETE: CPT

## 2024-04-03 RX ORDER — WARFARIN SODIUM 5 MG/1
TABLET ORAL
Qty: 30 TABLET | Refills: 5 | Status: SHIPPED | OUTPATIENT
Start: 2024-04-03

## 2024-04-30 RX ORDER — ALLOPURINOL 300 MG/1
300 TABLET ORAL DAILY
Qty: 90 TABLET | Refills: 1 | Status: SHIPPED | OUTPATIENT
Start: 2024-04-30

## 2024-04-30 RX ORDER — ATORVASTATIN CALCIUM 40 MG/1
40 TABLET, FILM COATED ORAL DAILY
Qty: 90 TABLET | Refills: 1 | Status: SHIPPED | OUTPATIENT
Start: 2024-04-30

## 2024-04-30 NOTE — TELEPHONE ENCOUNTER
Rx Refill Note  Requested Prescriptions     Pending Prescriptions Disp Refills    allopurinol (ZYLOPRIM) 300 MG tablet 90 tablet 1     Sig: Take 1 tablet by mouth Daily.    atorvastatin (LIPITOR) 40 MG tablet 90 tablet 1     Sig: Take 1 tablet by mouth Daily.      Last office visit with prescribing clinician: 1/10/2024   Last telemedicine visit with prescribing clinician: Visit date not found   Next office visit with prescribing clinician: 7/12/2024                         Would you like a call back once the refill request has been completed: [] Yes [] No    If the office needs to give you a call back, can they leave a voicemail: [] Yes [] No    Jo Villalobos LPN  04/30/24, 10:36 EDT

## 2024-05-06 DIAGNOSIS — E29.1 MALE HYPOGONADISM: ICD-10-CM

## 2024-05-06 RX ORDER — TESTOSTERONE 30 MG/1.5ML
SOLUTION TOPICAL
Qty: 270 ML | Refills: 2 | Status: SHIPPED | OUTPATIENT
Start: 2024-05-06

## 2024-05-22 ENCOUNTER — OFFICE VISIT (OUTPATIENT)
Dept: CARDIOLOGY | Facility: HOSPITAL | Age: 69
End: 2024-05-22
Payer: MEDICARE

## 2024-05-22 VITALS
WEIGHT: 259.6 LBS | RESPIRATION RATE: 18 BRPM | SYSTOLIC BLOOD PRESSURE: 128 MMHG | BODY MASS INDEX: 32.28 KG/M2 | DIASTOLIC BLOOD PRESSURE: 78 MMHG | TEMPERATURE: 97.2 F | HEART RATE: 58 BPM | OXYGEN SATURATION: 97 % | HEIGHT: 75 IN

## 2024-05-22 DIAGNOSIS — E78.5 HYPERLIPIDEMIA, UNSPECIFIED HYPERLIPIDEMIA TYPE: ICD-10-CM

## 2024-05-22 DIAGNOSIS — R00.2 PALPITATIONS: Primary | ICD-10-CM

## 2024-05-22 DIAGNOSIS — I10 PRIMARY HYPERTENSION: ICD-10-CM

## 2024-05-22 NOTE — PROGRESS NOTES
"[facility], [facility] V[facility]    Chief Complaint  Hypertension and Palpitations    Subjective    History of Present Illness {CC  Problem List  Visit  Diagnosis   Encounters  Notes  Medications  Labs  Result Review Imaging  Media :23}     Joseph Lundberg presents to [facility] CARDIOLOGY for   History of Present Illness     68-year-old male with hypertension (life style modifications, no current meds), hyperlipidemia, GERD, hypercoagulable state with hx of PE/DVT (chronic Coumadin therapy), hypogonadism, palpitations, CKD II, kidney stones.     Patient with palpitations.  Frequent brief SVT episodes on last Holter monitor.  Fairly asymptomatic.  Discussed possibilities of starting beta-blocker last office visit.  Patient wanted to monitor.  Plan for decreasing caffeine, increasing physical activity.    Echocardiogram 2/29/2024: Normal EF, no significant valvular disease    Home /80.      NO CP or pressure.  Occasional palpitations.  No concerns.  NO dyspnea. Baseline orthostatic dizziness, no syncope.      Objective     Vital Signs:   Vitals:    05/22/24 0928 05/22/24 0947   BP: 141/79 128/78   BP Location: Left arm    Patient Position: Sitting    Cuff Size: Adult    Pulse: 58    Resp: 18    Temp: 97.2 °F (36.2 °C)    TempSrc: Temporal    SpO2: 97%    Weight: 118 kg (259 lb 9.6 oz)    Height: 190.5 cm (75\")      Body mass index is 32.45 kg/m².  Physical Exam  Vitals reviewed.   Constitutional:       General: He is not in acute distress.  Cardiovascular:      Rate and Rhythm: Normal rate and regular rhythm.   Pulmonary:      Effort: Pulmonary effort is normal.      Breath sounds: Normal breath sounds.   Skin:     Coloration: Skin is not pale.   Neurological:      Mental Status: He is alert.   Psychiatric:         Mood and Affect: Mood normal.         Behavior: Behavior normal. Behavior is cooperative.              Result Review  Data Reviewed:{ " Labs  Result Review  Imaging  Med Tab  Media :23}   Echocardiogram 2/29/2024: Normal EF, no significant valvular disease    EKG 1/10/2024: Sinus rhythm 63 bpm, nonspecific T wave abnormality.     14-day Holter 1/12/2024: Diagnostic data 8 days.  Average heart rate 65 bpm.  PACs and PVCs less than 1%. 25 episodes of SVT, longest duration 19 beats.  Controlled rate 60%, bradycardic 38%.    Lab Results   Component Value Date    CHOL 146 01/23/2024    CHOL 139 05/26/2023    CHOL 153 10/31/2022     Lab Results   Component Value Date    TRIG 95 01/23/2024    TRIG 92 05/26/2023    TRIG 99 10/31/2022     Lab Results   Component Value Date    HDL 33 (L) 01/23/2024    HDL 31 (L) 05/26/2023    HDL 36 (L) 10/31/2022     Lab Results   Component Value Date    LDL 95 01/23/2024    LDL 90 05/26/2023    LDL 99 10/31/2022                   Assessment and Plan {CC Problem List  Visit Diagnosis  ROS  Review (Popup)  Health Maintenance  Quality  BestPractice  Medications  SmartSets  SnapShot Encounters  Media :23}   1. Palpitations  Occasional, primarily asymptomatic.    2. Primary hypertension  Blood pressure log improved.  Repeat blood pressure today improved.  Continue to monitor    3. Hyperlipidemia, unspecified hyperlipidemia type  Continue statin.          Follow Up {Instructions Charge Capture  Follow-up Communications :23}   Return if symptoms worsen or fail to improve.    Patient was given instructions and counseling regarding his condition or for health maintenance advice. Please see specific information pulled into the AVS if appropriate.  Patient was instructed to call the [facility] with any questions, concerns, or worsening symptoms.

## 2024-07-12 ENCOUNTER — OFFICE VISIT (OUTPATIENT)
Dept: INTERNAL MEDICINE | Facility: CLINIC | Age: 69
End: 2024-07-12
Payer: MEDICARE

## 2024-07-12 ENCOUNTER — LAB (OUTPATIENT)
Dept: LAB | Facility: HOSPITAL | Age: 69
End: 2024-07-12
Payer: MEDICARE

## 2024-07-12 VITALS
HEIGHT: 75 IN | WEIGHT: 262.4 LBS | DIASTOLIC BLOOD PRESSURE: 72 MMHG | SYSTOLIC BLOOD PRESSURE: 114 MMHG | BODY MASS INDEX: 32.63 KG/M2 | TEMPERATURE: 97.8 F | HEART RATE: 56 BPM

## 2024-07-12 DIAGNOSIS — D68.59 HYPERCOAGULABLE STATE: ICD-10-CM

## 2024-07-12 DIAGNOSIS — R73.09 ABNORMAL GLUCOSE: ICD-10-CM

## 2024-07-12 DIAGNOSIS — Z51.81 THERAPEUTIC DRUG MONITORING: ICD-10-CM

## 2024-07-12 DIAGNOSIS — E78.2 MIXED HYPERLIPIDEMIA: ICD-10-CM

## 2024-07-12 DIAGNOSIS — Z79.01 ANTICOAGULATED ON WARFARIN: ICD-10-CM

## 2024-07-12 DIAGNOSIS — I10 PRIMARY HYPERTENSION: Primary | ICD-10-CM

## 2024-07-12 LAB
ALBUMIN SERPL-MCNC: 4.5 G/DL (ref 3.5–5.2)
ALBUMIN/GLOB SERPL: 1.6 G/DL
ALP SERPL-CCNC: 116 U/L (ref 39–117)
ALT SERPL W P-5'-P-CCNC: 29 U/L (ref 1–41)
ANION GAP SERPL CALCULATED.3IONS-SCNC: 10.4 MMOL/L (ref 5–15)
AST SERPL-CCNC: 26 U/L (ref 1–40)
BILIRUB SERPL-MCNC: 1.4 MG/DL (ref 0–1.2)
BUN SERPL-MCNC: 12 MG/DL (ref 8–23)
BUN/CREAT SERPL: 9 (ref 7–25)
CALCIUM SPEC-SCNC: 9.6 MG/DL (ref 8.6–10.5)
CHLORIDE SERPL-SCNC: 103 MMOL/L (ref 98–107)
CHOLEST SERPL-MCNC: 150 MG/DL (ref 0–200)
CO2 SERPL-SCNC: 26.6 MMOL/L (ref 22–29)
CREAT SERPL-MCNC: 1.34 MG/DL (ref 0.76–1.27)
EGFRCR SERPLBLD CKD-EPI 2021: 58.4 ML/MIN/1.73
GLOBULIN UR ELPH-MCNC: 2.9 GM/DL
GLUCOSE SERPL-MCNC: 103 MG/DL (ref 65–99)
HBA1C MFR BLD: 6 % (ref 4.8–5.6)
HDLC SERPL-MCNC: 35 MG/DL (ref 40–60)
LDLC SERPL CALC-MCNC: 95 MG/DL (ref 0–100)
LDLC/HDLC SERPL: 2.68 {RATIO}
POTASSIUM SERPL-SCNC: 4.2 MMOL/L (ref 3.5–5.2)
PROT SERPL-MCNC: 7.4 G/DL (ref 6–8.5)
SODIUM SERPL-SCNC: 140 MMOL/L (ref 136–145)
TRIGL SERPL-MCNC: 106 MG/DL (ref 0–150)
VLDLC SERPL-MCNC: 20 MG/DL (ref 5–40)

## 2024-07-12 PROCEDURE — 80061 LIPID PANEL: CPT

## 2024-07-12 PROCEDURE — 3078F DIAST BP <80 MM HG: CPT | Performed by: INTERNAL MEDICINE

## 2024-07-12 PROCEDURE — 80053 COMPREHEN METABOLIC PANEL: CPT

## 2024-07-12 PROCEDURE — 1126F AMNT PAIN NOTED NONE PRSNT: CPT | Performed by: INTERNAL MEDICINE

## 2024-07-12 PROCEDURE — 85610 PROTHROMBIN TIME: CPT | Performed by: INTERNAL MEDICINE

## 2024-07-12 PROCEDURE — G2211 COMPLEX E/M VISIT ADD ON: HCPCS | Performed by: INTERNAL MEDICINE

## 2024-07-12 PROCEDURE — 99214 OFFICE O/P EST MOD 30 MIN: CPT | Performed by: INTERNAL MEDICINE

## 2024-07-12 PROCEDURE — 3074F SYST BP LT 130 MM HG: CPT | Performed by: INTERNAL MEDICINE

## 2024-07-12 PROCEDURE — 1160F RVW MEDS BY RX/DR IN RCRD: CPT | Performed by: INTERNAL MEDICINE

## 2024-07-12 PROCEDURE — 83036 HEMOGLOBIN GLYCOSYLATED A1C: CPT

## 2024-07-12 PROCEDURE — 36416 COLLJ CAPILLARY BLOOD SPEC: CPT | Performed by: INTERNAL MEDICINE

## 2024-07-12 PROCEDURE — 1159F MED LIST DOCD IN RCRD: CPT | Performed by: INTERNAL MEDICINE

## 2024-07-12 NOTE — PROGRESS NOTES
[facility]     Joseph Lundberg  1955   [id number]      Patient Care Team:  Jairo Carter MD as PCP - General (Internal Medicine)  Kadeem Olson MD as Consulting Physician (Gastroenterology)    Chief Complaint::   Chief Complaint   Patient presents with    Hypertension     6 mo. Follow up      Hyperlipidemia    wants PT/INR        HPI  History of Present Illness  The patient is a 68-year-old male who comes in for follow-up of hypertension, hyperlipidemia, and hypercoagulable state.    He reports feeling well physically, although the past 6 months have been stressful.    He was informed by Dr. Geiger of tachycardia, but his echocardiogram results were normal. He continues to experience palpitations. He is currently on blood thinners.      Chronic Conditions:      Patient Active Problem List   Diagnosis    AR (allergic rhinitis)    Primary hypertension    Dyspnea on exertion    GERD (gastroesophageal reflux disease)    Hypercoagulable state    Mixed hyperlipidemia    Male hypogonadism    Mild persistent asthma    Postphlebitic syndrome    Pulmonary embolism    Therapeutic drug monitoring    Anticoagulated on warfarin    Abnormal glucose        Past Medical History:   Diagnosis Date    Achilles tendon injury     Allergic     Colon polyp     Deep vein thrombosis     Hyperlipidemia     Nephrolithiasis      (uric acid/calcium)    Pulmonary embolism 2011    Pulmonary embolism 2002    DVT    Pulmonary embolism 2010    DVT     Torn meniscus 2002    left     Torn meniscus 2008    left       Past Surgical History:   Procedure Laterality Date    ADENOIDECTOMY  1962    COLONOSCOPY      FL CYSTO URETHROSCOPY W REM FB      KNEE ARTHROSCOPY W/ MENISCAL REPAIR Left 2008    KNEE ARTHROSCOPY W/ MENISCAL REPAIR Right 2002    TONSILLECTOMY      VASECTOMY  1994       Family History   Problem Relation Age of Onset    Heart attack Mother 73    Heart disease Mother     Arthritis Mother     Heart failure  Mother     Heart attack Father 28        and 62    Heart disease Father     Heart failure Father     Heart attack Brother     Coronary artery disease Brother 43        premature     Heart disease Brother     Heart failure Brother     Heart attack Paternal Uncle 61    Heart disease Paternal Uncle     Kidney disease Paternal Uncle     Stroke Maternal Grandmother     Arthritis Maternal Grandmother     Heart disease Maternal Grandfather     No Known Problems Paternal Grandmother     No Known Problems Paternal Grandfather        Social History     Socioeconomic History    Marital status:    Tobacco Use    Smoking status: Never     Passive exposure: Never    Smokeless tobacco: Never   Vaping Use    Vaping status: Never Used   Substance and Sexual Activity    Alcohol use: Yes     Alcohol/week: 1.0 standard drink of alcohol     Types: 1 Cans of beer per week     Comment: 1 drink monthly    Drug use: Never    Sexual activity: Yes     Partners: Female     Birth control/protection: Post-menopausal, Vasectomy       Allergies   Allergen Reactions    Penicillins Other (See Comments)     feverish         Current Outpatient Medications:     allopurinol (ZYLOPRIM) 300 MG tablet, Take 1 tablet by mouth Daily., Disp: 90 tablet, Rfl: 1    atorvastatin (LIPITOR) 40 MG tablet, Take 1 tablet by mouth Daily., Disp: 90 tablet, Rfl: 1    Cetirizine HCl 10 MG capsule, Take 1 capsule by mouth Daily As Needed., Disp: , Rfl:     famotidine (PEPCID) 20 MG tablet, Take 1 tablet by mouth As Needed., Disp: , Rfl:     Testosterone 30 MG/ACT solution, APPLY 1 PUMP TO ARM DAILY, Disp: 270 mL, Rfl: 2    warfarin (Jantoven) 5 MG tablet, TAKE 1 TABLET DAILY, Disp: 30 tablet, Rfl: 5    Review of Systems   Constitutional: Negative.    Respiratory: Negative.  Negative for chest tightness and shortness of breath.    Cardiovascular: Negative.  Negative for chest pain.   Gastrointestinal:  Negative for abdominal pain, blood in stool, constipation and  "diarrhea.        Vital Signs  Vitals:    07/12/24 0908   BP: 114/72   BP Location: Left arm   Patient Position: Sitting   Cuff Size: Large Adult   Pulse: 56   Temp: 97.8 °F (36.6 °C)   TempSrc: Temporal   Weight: 119 kg (262 lb 6.4 oz)   Height: 190.5 cm (75\")       Physical Exam  Vitals reviewed.   Constitutional:       Appearance: He is well-developed.   HENT:      Head: Normocephalic and atraumatic.   Neck:      Thyroid: No thyromegaly.      Vascular: No carotid bruit.   Cardiovascular:      Rate and Rhythm: Normal rate and regular rhythm.      Heart sounds: Normal heart sounds. No murmur heard.     No friction rub. No gallop.   Pulmonary:      Effort: Pulmonary effort is normal.      Breath sounds: Normal breath sounds.   Musculoskeletal:      Cervical back: Normal range of motion and neck supple.      Right lower leg: No edema.      Left lower leg: No edema.   Lymphadenopathy:      Cervical: No cervical adenopathy.   Skin:     General: Skin is dry.   Neurological:      Mental Status: He is oriented to person, place, and time.      Cranial Nerves: No cranial nerve deficit.   Psychiatric:         Mood and Affect: Mood normal.         Behavior: Behavior normal.        Physical Exam      Procedures    ACE III MINI        Results  Laboratory Studies  INR is therapeutic.    Imaging  Echo of the heart showed no issues.           Assessment/Plan:    Diagnoses and all orders for this visit:    1. Primary hypertension (Primary)    2. Therapeutic drug monitoring  -     POC Protime / INR    3. Anticoagulated on warfarin  -     POC Protime / INR    4. Mixed hyperlipidemia  -     Comprehensive Metabolic Panel; Future  -     Lipid Panel; Future    5. Hypercoagulable state    6. Abnormal glucose  -     Hemoglobin A1c; Future      Assessment & Plan  1. Hypertension.  Blood pressure is well controlled on nonpharmacologic therapy.    2. Hyperlipidemia.  Lipid panel is pending. Continue atorvastatin and healthy diet.    3. " Hypercoagulable state.  INR is therapeutic. He will remain on warfarin.    4. Abnormal glucose.  A1c is pending. The treatment remains healthy diet and avoidance of weight gain.    Follow-up  The patient will follow up in 6 months.      Plan of care reviewed with patient at the conclusion of today's visit. Education was provided regarding diagnosis, management, and any prescribed or recommended OTC medications.Patient verbalizes understanding of and agreement with management plan.     Patient or patient representative verbalized consent for the use of Ambient Listening during the visit with  Jairo Carter MD for chart documentation. 7/14/2024  17:10 EDT        Jairo Carter MD

## 2024-07-15 LAB
INR PPP: 1.7 (ref 0.9–1.1)
PROTHROMBIN TIME: 20.2 SECONDS

## 2024-09-23 RX ORDER — WARFARIN SODIUM 5 MG/1
TABLET ORAL
Qty: 30 TABLET | Refills: 5 | Status: SHIPPED | OUTPATIENT
Start: 2024-09-23

## 2024-10-29 ENCOUNTER — CLINICAL SUPPORT (OUTPATIENT)
Dept: INTERNAL MEDICINE | Facility: CLINIC | Age: 69
End: 2024-10-29
Payer: MEDICARE

## 2024-10-29 DIAGNOSIS — Z79.01 ANTICOAGULATED ON WARFARIN: ICD-10-CM

## 2024-10-29 DIAGNOSIS — Z51.81 THERAPEUTIC DRUG MONITORING: Primary | ICD-10-CM

## 2024-10-29 LAB
INR PPP: 2.5 (ref 0.9–1.1)
PROTHROMBIN TIME: 29.8 SECONDS

## 2024-10-29 PROCEDURE — 85610 PROTHROMBIN TIME: CPT | Performed by: INTERNAL MEDICINE

## 2024-10-29 PROCEDURE — 36416 COLLJ CAPILLARY BLOOD SPEC: CPT | Performed by: INTERNAL MEDICINE

## 2024-10-30 RX ORDER — ATORVASTATIN CALCIUM 40 MG/1
40 TABLET, FILM COATED ORAL DAILY
Qty: 90 TABLET | Refills: 1 | Status: SHIPPED | OUTPATIENT
Start: 2024-10-30

## 2024-10-30 RX ORDER — ALLOPURINOL 300 MG/1
300 TABLET ORAL DAILY
Qty: 90 TABLET | Refills: 1 | Status: SHIPPED | OUTPATIENT
Start: 2024-10-30

## 2024-10-30 NOTE — TELEPHONE ENCOUNTER
Rx Refill Note  Requested Prescriptions     Pending Prescriptions Disp Refills    atorvastatin (LIPITOR) 40 MG tablet [Pharmacy Med Name: ATORVASTATIN TAB 40MG] 90 tablet 1     Sig: TAKE 1 TABLET DAILY    allopurinol (ZYLOPRIM) 300 MG tablet [Pharmacy Med Name: ALLOPURINOL  TAB 300MG] 90 tablet 1     Sig: TAKE 1 TABLET DAILY      Last office visit with prescribing clinician: 7/12/2024   Last telemedicine visit with prescribing clinician: Visit date not found   Next office visit with prescribing clinician: 1/15/2025                         Would you like a call back once the refill request has been completed: [] Yes [] No    If the office needs to give you a call back, can they leave a voicemail: [] Yes [] No    Leslee Herndon LPN  10/30/24, 09:02 EDT

## 2024-11-03 DIAGNOSIS — E29.1 MALE HYPOGONADISM: ICD-10-CM

## 2024-11-04 RX ORDER — TESTOSTERONE 30 MG/1.5ML
SOLUTION TOPICAL
Qty: 90 ML | Refills: 2 | Status: SHIPPED | OUTPATIENT
Start: 2024-11-04

## 2024-11-04 NOTE — TELEPHONE ENCOUNTER
Rx Refill Note  Requested Prescriptions     Pending Prescriptions Disp Refills    Testosterone 30 MG/ACT solution [Pharmacy Med Name: TESTOSTERONE 30 MG/1.5 ML PUMP] 90 mL      Sig: APPLY 1 PUMP TO ARM DAILY      Last refill:  5/6/24 #270/2  Last office visit with prescribing clinician: 7/12/2024   Last telemedicine visit with prescribing clinician: Visit date not found   Next office visit with prescribing clinician: 1/15/2025                         Would you like a call back once the refill request has been completed: [] Yes [] No    If the office needs to give you a call back, can they leave a voicemail: [] Yes [] No    Maggie Odell RN  11/04/24, 12:23 EST

## 2025-02-26 ENCOUNTER — OFFICE VISIT (OUTPATIENT)
Dept: INTERNAL MEDICINE | Facility: CLINIC | Age: 70
End: 2025-02-26
Payer: MEDICARE

## 2025-02-26 VITALS
HEIGHT: 75 IN | BODY MASS INDEX: 32.9 KG/M2 | DIASTOLIC BLOOD PRESSURE: 82 MMHG | TEMPERATURE: 99.6 F | WEIGHT: 264.6 LBS | OXYGEN SATURATION: 95 % | SYSTOLIC BLOOD PRESSURE: 142 MMHG | HEART RATE: 102 BPM

## 2025-02-26 DIAGNOSIS — D68.59 HYPERCOAGULABLE STATE: ICD-10-CM

## 2025-02-26 DIAGNOSIS — K21.9 GASTROESOPHAGEAL REFLUX DISEASE WITHOUT ESOPHAGITIS: ICD-10-CM

## 2025-02-26 DIAGNOSIS — R73.09 ABNORMAL GLUCOSE: ICD-10-CM

## 2025-02-26 DIAGNOSIS — Z51.81 ENCOUNTER FOR THERAPEUTIC DRUG MONITORING: ICD-10-CM

## 2025-02-26 DIAGNOSIS — Z79.01 LONG TERM (CURRENT) USE OF ANTICOAGULANTS: ICD-10-CM

## 2025-02-26 DIAGNOSIS — J20.8 VIRAL BRONCHITIS: ICD-10-CM

## 2025-02-26 DIAGNOSIS — E29.1 MALE HYPOGONADISM: ICD-10-CM

## 2025-02-26 DIAGNOSIS — R05.1 ACUTE COUGH: Primary | ICD-10-CM

## 2025-02-26 DIAGNOSIS — I10 PRIMARY HYPERTENSION: ICD-10-CM

## 2025-02-26 DIAGNOSIS — E79.0 HYPERURICEMIA: ICD-10-CM

## 2025-02-26 DIAGNOSIS — Z00.00 MEDICARE ANNUAL WELLNESS VISIT, SUBSEQUENT: ICD-10-CM

## 2025-02-26 DIAGNOSIS — I87.009 POSTPHLEBITIC SYNDROME: ICD-10-CM

## 2025-02-26 DIAGNOSIS — Z12.5 PROSTATE CANCER SCREENING: ICD-10-CM

## 2025-02-26 DIAGNOSIS — E78.2 MIXED HYPERLIPIDEMIA: ICD-10-CM

## 2025-02-26 LAB
EXPIRATION DATE: NORMAL
FLUAV AG UPPER RESP QL IA.RAPID: NOT DETECTED
FLUBV AG UPPER RESP QL IA.RAPID: NOT DETECTED
INR PPP: 2.4 (ref 0.9–1.1)
INTERNAL CONTROL: NORMAL
Lab: NORMAL
PROTHROMBIN TIME: 28.4 SECONDS
SARS-COV-2 AG UPPER RESP QL IA.RAPID: NOT DETECTED

## 2025-02-26 RX ORDER — TRIAMCINOLONE ACETONIDE 40 MG/ML
80 INJECTION, SUSPENSION INTRA-ARTICULAR; INTRAMUSCULAR ONCE
Status: COMPLETED | OUTPATIENT
Start: 2025-02-26 | End: 2025-02-26

## 2025-02-26 RX ADMIN — TRIAMCINOLONE ACETONIDE 80 MG: 40 INJECTION, SUSPENSION INTRA-ARTICULAR; INTRAMUSCULAR at 12:57

## 2025-02-26 NOTE — PROGRESS NOTES
Subjective   The ABCs of the Annual Wellness Visit  Medicare Wellness Visit      Joseph Lundberg is a 69 y.o. patient who presents for a Medicare Wellness Visit.    The following portions of the patient's history were reviewed and   updated as appropriate: allergies, current medications, past family history, past medical history, past social history, past surgical history, and problem list.    Compared to one year ago, the patient's physical   health is the same.  Compared to one year ago, the patient's mental   health is the same.    Recent Hospitalizations:  He was not admitted to the hospital during the last year.     Current Medical Providers:  Patient Care Team:  Jairo Carter MD as PCP - General (Internal Medicine)  Kadeem Olson MD as Consulting Physician (Gastroenterology)    Outpatient Medications Prior to Visit   Medication Sig Dispense Refill    allopurinol (ZYLOPRIM) 300 MG tablet TAKE 1 TABLET DAILY 90 tablet 1    atorvastatin (LIPITOR) 40 MG tablet TAKE 1 TABLET DAILY 90 tablet 1    Cetirizine HCl 10 MG capsule Take 1 capsule by mouth Daily As Needed.      famotidine (PEPCID) 20 MG tablet Take 1 tablet by mouth As Needed.      Testosterone 30 MG/ACT solution APPLY 1 PUMP TO ARM DAILY 90 mL 2    warfarin (Jantoven) 5 MG tablet TAKE 1 TABLET DAILY 30 tablet 5     No facility-administered medications prior to visit.     No opioid medication identified on active medication list. I have reviewed chart for other potential  high risk medication/s and harmful drug interactions in the elderly.      Aspirin is not on active medication list.  Aspirin use is not indicated based on review of current medical condition/s. Risk of harm outweighs potential benefits.  .    Patient Active Problem List   Diagnosis    AR (allergic rhinitis)    Primary hypertension    Dyspnea on exertion    GERD (gastroesophageal reflux disease)    Hypercoagulable state    Mixed hyperlipidemia    Male hypogonadism    Mild  "persistent asthma    Postphlebitic syndrome    Pulmonary embolism    Therapeutic drug monitoring    Anticoagulated on warfarin    Abnormal glucose     Advance Care Planning Advance Directive is not on file.  ACP discussion was held with the patient during this visit. Patient has an advance directive (not in EMR), copy requested.            Objective   Vitals:    25 1235   BP: 142/82   BP Location: Left arm   Patient Position: Sitting   Cuff Size: Adult   Pulse: 102   Temp: 99.6 °F (37.6 °C)   TempSrc: Infrared   SpO2: 95%   Weight: 120 kg (264 lb 9.6 oz)   Height: 190.5 cm (75\")   PainSc: 0-No pain       Estimated body mass index is 33.07 kg/m² as calculated from the following:    Height as of this encounter: 190.5 cm (75\").    Weight as of this encounter: 120 kg (264 lb 9.6 oz).    BMI is >= 30 and <35. (Class 1 Obesity). The following options were offered after discussion;: exercise counseling/recommendations and nutrition counseling/recommendations           Does the patient have evidence of cognitive impairment? No                                                                                                Health  Risk Assessment    Smoking Status:  Social History     Tobacco Use   Smoking Status Never    Passive exposure: Never   Smokeless Tobacco Never     Alcohol Consumption:  Social History     Substance and Sexual Activity   Alcohol Use Yes    Alcohol/week: 1.0 standard drink of alcohol    Types: 1 Cans of beer per week    Comment: 1 drink monthly       Fall Risk Screen  STEADI Fall Risk Assessment was completed, and patient is at LOW risk for falls.Assessment completed on:2025    Depression Screening   Little interest or pleasure in doing things? Not at all   Feeling down, depressed, or hopeless? Not at all   PHQ-2 Total Score 0      Health Habits and Functional and Cognitive Screenin/24/2025     9:43 AM   Functional & Cognitive Status   Do you have difficulty preparing food and " eating? No    Do you have difficulty bathing yourself, getting dressed or grooming yourself? No    Do you have difficulty using the toilet? No    Do you have difficulty moving around from place to place? No    Do you have trouble with steps or getting out of a bed or a chair? No    Current Diet Well Balanced Diet    Dental Exam Up to date    Eye Exam Not up to date    Exercise (times per week) 3 times per week    Current Exercises Include Stationary Bicycling/Spin Class;Walking;Weightlifting    Do you need help using the phone?  No    Are you deaf or do you have serious difficulty hearing?  No    Do you need help to go to places out of walking distance? No    Do you need help shopping? No    Do you need help preparing meals?  No    Do you need help with housework?  No    Do you need help with laundry? No    Do you need help taking your medications? No    Do you need help managing money? No    Do you ever drive or ride in a car without wearing a seat belt? No    Have you felt unusual stress, anger or loneliness in the last month? No    Who do you live with? Spouse    If you need help, do you have trouble finding someone available to you? No    Have you been bothered in the last four weeks by sexual problems? No    Do you have difficulty concentrating, remembering or making decisions? No        Patient-reported           Age-appropriate Screening Schedule:  Refer to the list below for future screening recommendations based on patient's age, sex and/or medical conditions. Orders for these recommended tests are listed in the plan section. The patient has been provided with a written plan.    Health Maintenance List  Health Maintenance   Topic Date Due    Pneumococcal Vaccine 50+ (1 of 2 - PCV) Never done    TDAP/TD VACCINES (1 - Tdap) Never done    INFLUENZA VACCINE  07/01/2024    COVID-19 Vaccine (4 - 2024-25 season) 09/01/2024    BMI FOLLOWUP  01/10/2025    LIPID PANEL  07/12/2025    ANNUAL WELLNESS VISIT  02/26/2026     COLORECTAL CANCER SCREENING  11/16/2031    HEPATITIS C SCREENING  Completed    ZOSTER VACCINE  Completed                                                                                                                                                CMS Preventative Services Quick Reference  Risk Factors Identified During Encounter  None Identified    The above risks/problems have been discussed with the patient.  Pertinent information has been shared with the patient in the After Visit Summary.  An After Visit Summary and PPPS were made available to the patient.    Follow Up:   Next Medicare Wellness visit to be scheduled in 1 year.     No opioid medication identified on active medication list. I have reviewed chart for other potential  high risk medication/s and harmful drug interactions in the elderly.      Additional E&M Note during same encounter follows:  Patient has additional, significant, and separately identifiable condition(s)/problem(s) that require work above and beyond the Medicare Wellness Visit     Chief Complaint  Annual Exam, Hypertension, Hyperlipidemia, Cough, Chills, and Nasal Congestion    Subjective    HPI         The patient is a 69-year-old male who presents for a subsequent Medicare annual wellness examination and follow-up of hypertension, hyperlipidemia, hypercoagulable state, hypogonadism, and GERD. He also complains of cough, chills, and congestion today.    He reports experiencing dryness in his throat, accompanied by a non-productive cough and generalized weakness. He also notes the onset of shortness of breath. He does not believe these symptoms are indicative of a sinus infection, as he does not experience any tenderness or pain upon bending down. However, he does report a sensation of postnasal drainage. He suspects a viral etiology for his symptoms, as his daughter and grandson have recently fallen ill. He has been managing well until the onset of these symptoms yesterday. He  "also reports experiencing body aches, particularly in his legs and knees, which he notes tend to ache when he is unwell.    Supplemental Information  He saw Dr. Gallagher at the end of last year, who did a clean out. He tried cortisone, but now he is doing the gel injections, which helps.    MEDICATIONS  Current: atorvastatin, warfarin, topical testosterone          Objective   Vital Signs:  /82 (BP Location: Left arm, Patient Position: Sitting, Cuff Size: Adult)   Pulse 102   Temp 99.6 °F (37.6 °C) (Infrared)   Ht 190.5 cm (75\")   Wt 120 kg (264 lb 9.6 oz)   SpO2 95%   BMI 33.07 kg/m²   Physical Exam  Vitals reviewed.   Constitutional:       Appearance: Normal appearance. He is well-developed.   HENT:      Head: Normocephalic and atraumatic.      Right Ear: Tympanic membrane and ear canal normal.      Left Ear: Tympanic membrane and ear canal normal.      Mouth/Throat:      Pharynx: Oropharynx is clear. No posterior oropharyngeal erythema.   Eyes:      Extraocular Movements: Extraocular movements intact.      Conjunctiva/sclera: Conjunctivae normal.      Pupils: Pupils are equal, round, and reactive to light.   Neck:      Thyroid: No thyromegaly.      Vascular: No carotid bruit.   Cardiovascular:      Rate and Rhythm: Normal rate and regular rhythm.      Heart sounds: Normal heart sounds. No murmur heard.     No friction rub. No gallop.   Pulmonary:      Effort: Pulmonary effort is normal.      Breath sounds: Wheezing present.      Comments: Expiratory wheezing throughout  Abdominal:      General: Bowel sounds are normal.      Palpations: Abdomen is soft. There is no mass.      Tenderness: There is no abdominal tenderness.   Musculoskeletal:      Cervical back: Normal range of motion and neck supple.      Right lower leg: No edema.      Left lower leg: No edema.   Lymphadenopathy:      Cervical: No cervical adenopathy.   Skin:     General: Skin is warm and dry.   Neurological:      Mental Status: He is alert " and oriented to person, place, and time.      Cranial Nerves: No cranial nerve deficit.   Psychiatric:         Mood and Affect: Mood normal.         Behavior: Behavior normal.                       Results  Laboratory Studies  INR is 2.4.              Assessment and Plan        1. Health maintenance.  He is up to date on colorectal cancer screening. Blood work has been ordered for him to complete at his convenience.    2. Hypertension.  Blood pressure is controlled on nonpharmacologic therapy.    3. Hyperlipidemia.  Lipid panel is pending. He will continue a healthy diet and atorvastatin.    4. Hypercoagulable state.  INR is 2.4. He will continue the current dose of warfarin indefinitely.    5. Postphlebitic syndrome.  He will continue wearing compression.    6. Hypogonadism.  Testosterone level is pending. He will continue topical testosterone.    7. Viral bronchitis.  Influenza and COVID-19 screens are negative. He is given parental triamcinolone.    Follow-up  The patient will follow up in 6 months.            Follow Up   Return in about 6 months (around 8/26/2025) for follow up.  Patient was given instructions and counseling regarding his condition or for health maintenance advice. Please see specific information pulled into the AVS if appropriate.  Patient or patient representative verbalized consent for the use of Ambient Listening during the visit with  Jairo Carter MD for chart documentation. 2/28/2025  13:00 EST

## 2025-03-12 ENCOUNTER — LAB (OUTPATIENT)
Dept: LAB | Facility: HOSPITAL | Age: 70
End: 2025-03-12
Payer: MEDICARE

## 2025-03-12 DIAGNOSIS — E78.2 MIXED HYPERLIPIDEMIA: ICD-10-CM

## 2025-03-12 DIAGNOSIS — E29.1 MALE HYPOGONADISM: ICD-10-CM

## 2025-03-12 DIAGNOSIS — E79.0 HYPERURICEMIA: ICD-10-CM

## 2025-03-12 DIAGNOSIS — R73.09 ABNORMAL GLUCOSE: ICD-10-CM

## 2025-03-12 DIAGNOSIS — Z12.5 PROSTATE CANCER SCREENING: ICD-10-CM

## 2025-03-12 DIAGNOSIS — I10 PRIMARY HYPERTENSION: ICD-10-CM

## 2025-03-12 LAB
ALBUMIN SERPL-MCNC: 4.2 G/DL (ref 3.5–5.2)
ALBUMIN UR-MCNC: <1.2 MG/DL
ALBUMIN/GLOB SERPL: 1.4 G/DL
ALP SERPL-CCNC: 133 U/L (ref 39–117)
ALT SERPL W P-5'-P-CCNC: 26 U/L (ref 1–41)
ANION GAP SERPL CALCULATED.3IONS-SCNC: 11.7 MMOL/L (ref 5–15)
AST SERPL-CCNC: 20 U/L (ref 1–40)
BASOPHILS # BLD AUTO: 0.04 10*3/MM3 (ref 0–0.2)
BASOPHILS NFR BLD AUTO: 0.5 % (ref 0–1.5)
BILIRUB SERPL-MCNC: 1 MG/DL (ref 0–1.2)
BUN SERPL-MCNC: 13 MG/DL (ref 8–23)
BUN/CREAT SERPL: 10.2 (ref 7–25)
CALCIUM SPEC-SCNC: 9.8 MG/DL (ref 8.6–10.5)
CHLORIDE SERPL-SCNC: 102 MMOL/L (ref 98–107)
CHOLEST SERPL-MCNC: 139 MG/DL (ref 0–200)
CO2 SERPL-SCNC: 28.3 MMOL/L (ref 22–29)
CREAT SERPL-MCNC: 1.27 MG/DL (ref 0.76–1.27)
CREAT UR-MCNC: 176.1 MG/DL
DEPRECATED RDW RBC AUTO: 48.7 FL (ref 37–54)
EGFRCR SERPLBLD CKD-EPI 2021: 61.9 ML/MIN/1.73
EOSINOPHIL # BLD AUTO: 0.05 10*3/MM3 (ref 0–0.4)
EOSINOPHIL NFR BLD AUTO: 0.6 % (ref 0.3–6.2)
ERYTHROCYTE [DISTWIDTH] IN BLOOD BY AUTOMATED COUNT: 14.4 % (ref 12.3–15.4)
GLOBULIN UR ELPH-MCNC: 3.1 GM/DL
GLUCOSE SERPL-MCNC: 103 MG/DL (ref 65–99)
HBA1C MFR BLD: 5.9 % (ref 4.8–5.6)
HCT VFR BLD AUTO: 50.6 % (ref 37.5–51)
HDLC SERPL-MCNC: 39 MG/DL (ref 40–60)
HGB BLD-MCNC: 16.8 G/DL (ref 13–17.7)
IMM GRANULOCYTES # BLD AUTO: 0.03 10*3/MM3 (ref 0–0.05)
IMM GRANULOCYTES NFR BLD AUTO: 0.4 % (ref 0–0.5)
LDLC SERPL CALC-MCNC: 84 MG/DL (ref 0–100)
LDLC/HDLC SERPL: 2.13 {RATIO}
LYMPHOCYTES # BLD AUTO: 1.56 10*3/MM3 (ref 0.7–3.1)
LYMPHOCYTES NFR BLD AUTO: 20.1 % (ref 19.6–45.3)
MCH RBC QN AUTO: 30.6 PG (ref 26.6–33)
MCHC RBC AUTO-ENTMCNC: 33.2 G/DL (ref 31.5–35.7)
MCV RBC AUTO: 92.2 FL (ref 79–97)
MICROALBUMIN/CREAT UR: NORMAL MG/G{CREAT}
MONOCYTES # BLD AUTO: 0.54 10*3/MM3 (ref 0.1–0.9)
MONOCYTES NFR BLD AUTO: 7 % (ref 5–12)
NEUTROPHILS NFR BLD AUTO: 5.54 10*3/MM3 (ref 1.7–7)
NEUTROPHILS NFR BLD AUTO: 71.4 % (ref 42.7–76)
NRBC BLD AUTO-RTO: 0 /100 WBC (ref 0–0.2)
PLATELET # BLD AUTO: 201 10*3/MM3 (ref 140–450)
PMV BLD AUTO: 10.5 FL (ref 6–12)
POTASSIUM SERPL-SCNC: 4.6 MMOL/L (ref 3.5–5.2)
PROT SERPL-MCNC: 7.3 G/DL (ref 6–8.5)
PSA SERPL-MCNC: 2.52 NG/ML (ref 0–4)
RBC # BLD AUTO: 5.49 10*6/MM3 (ref 4.14–5.8)
SODIUM SERPL-SCNC: 142 MMOL/L (ref 136–145)
TESTOST SERPL-MCNC: 161 NG/DL (ref 193–740)
TRIGL SERPL-MCNC: 84 MG/DL (ref 0–150)
URATE SERPL-MCNC: 4.5 MG/DL (ref 3.4–7)
VLDLC SERPL-MCNC: 16 MG/DL (ref 5–40)
WBC NRBC COR # BLD AUTO: 7.76 10*3/MM3 (ref 3.4–10.8)

## 2025-03-12 PROCEDURE — 84550 ASSAY OF BLOOD/URIC ACID: CPT

## 2025-03-12 PROCEDURE — 83036 HEMOGLOBIN GLYCOSYLATED A1C: CPT

## 2025-03-12 PROCEDURE — G0103 PSA SCREENING: HCPCS

## 2025-03-12 PROCEDURE — 84403 ASSAY OF TOTAL TESTOSTERONE: CPT

## 2025-03-12 PROCEDURE — 82570 ASSAY OF URINE CREATININE: CPT

## 2025-03-12 PROCEDURE — 82043 UR ALBUMIN QUANTITATIVE: CPT

## 2025-03-12 PROCEDURE — 80053 COMPREHEN METABOLIC PANEL: CPT

## 2025-03-12 PROCEDURE — 80061 LIPID PANEL: CPT

## 2025-03-12 PROCEDURE — 85025 COMPLETE CBC W/AUTO DIFF WBC: CPT

## 2025-03-31 RX ORDER — WARFARIN SODIUM 5 MG/1
5 TABLET ORAL DAILY
Qty: 30 TABLET | Refills: 5 | Status: SHIPPED | OUTPATIENT
Start: 2025-03-31

## 2025-03-31 NOTE — TELEPHONE ENCOUNTER
Rx Refill Note  Requested Prescriptions     Pending Prescriptions Disp Refills    Jantoven 5 MG tablet [Pharmacy Med Name: JANTOVEN TAB 5MG] 30 tablet 5     Sig: TAKE 1 TABLET DAILY      Last office visit with prescribing clinician: 2/26/2025   Last telemedicine visit with prescribing clinician: Visit date not found   Next office visit with prescribing clinician: 8/26/2025                         Would you like a call back once the refill request has been completed: [] Yes [] No    If the office needs to give you a call back, can they leave a voicemail: [] Yes [] No    Diandra Bay MA  03/31/25, 08:03 EDT

## 2025-04-21 RX ORDER — ATORVASTATIN CALCIUM 40 MG/1
40 TABLET, FILM COATED ORAL DAILY
Qty: 90 TABLET | Refills: 1 | Status: SHIPPED | OUTPATIENT
Start: 2025-04-21

## 2025-04-21 RX ORDER — ALLOPURINOL 300 MG/1
300 TABLET ORAL DAILY
Qty: 90 TABLET | Refills: 1 | Status: SHIPPED | OUTPATIENT
Start: 2025-04-21

## 2025-04-21 NOTE — TELEPHONE ENCOUNTER
Rx Refill Note  Requested Prescriptions     Pending Prescriptions Disp Refills    atorvastatin (LIPITOR) 40 MG tablet [Pharmacy Med Name: ATORVASTATIN TAB 40MG] 90 tablet 1     Sig: TAKE 1 TABLET DAILY    allopurinol (ZYLOPRIM) 300 MG tablet [Pharmacy Med Name: ALLOPURINOL  TAB 300MG] 90 tablet 1     Sig: TAKE 1 TABLET DAILY      Last office visit with prescribing clinician: 2/26/2025   Last telemedicine visit with prescribing clinician: Visit date not found   Next office visit with prescribing clinician: 8/26/2025                         Would you like a call back once the refill request has been completed: [] Yes [] No    If the office needs to give you a call back, can they leave a voicemail: [] Yes [] No    Laila Barron MA  04/21/25, 09:03 EDT

## 2025-04-25 DIAGNOSIS — E29.1 MALE HYPOGONADISM: ICD-10-CM

## 2025-04-25 RX ORDER — TESTOSTERONE 30 MG/1.5ML
SOLUTION TOPICAL
Qty: 180 ML | Refills: 2 | Status: SHIPPED | OUTPATIENT
Start: 2025-04-25

## 2025-08-26 ENCOUNTER — OFFICE VISIT (OUTPATIENT)
Dept: INTERNAL MEDICINE | Facility: CLINIC | Age: 70
End: 2025-08-26
Payer: MEDICARE

## 2025-08-26 ENCOUNTER — LAB (OUTPATIENT)
Dept: LAB | Facility: HOSPITAL | Age: 70
End: 2025-08-26
Payer: MEDICARE

## 2025-08-26 VITALS
HEART RATE: 62 BPM | HEIGHT: 75 IN | TEMPERATURE: 96.8 F | SYSTOLIC BLOOD PRESSURE: 124 MMHG | WEIGHT: 263.2 LBS | OXYGEN SATURATION: 97 % | DIASTOLIC BLOOD PRESSURE: 76 MMHG | BODY MASS INDEX: 32.73 KG/M2

## 2025-08-26 DIAGNOSIS — D68.59 HYPERCOAGULABLE STATE: ICD-10-CM

## 2025-08-26 DIAGNOSIS — Z51.81 THERAPEUTIC DRUG MONITORING: ICD-10-CM

## 2025-08-26 DIAGNOSIS — E78.2 MIXED HYPERLIPIDEMIA: ICD-10-CM

## 2025-08-26 DIAGNOSIS — R73.09 ABNORMAL GLUCOSE: ICD-10-CM

## 2025-08-26 DIAGNOSIS — E29.1 MALE HYPOGONADISM: ICD-10-CM

## 2025-08-26 DIAGNOSIS — Z79.01 LONG TERM (CURRENT) USE OF ANTICOAGULANTS: ICD-10-CM

## 2025-08-26 DIAGNOSIS — I10 PRIMARY HYPERTENSION: Primary | ICD-10-CM

## 2025-08-26 LAB
HBA1C MFR BLD: 5.7 % (ref 4.8–5.6)
INR PPP: 2 (ref 0.9–1.1)
PROTHROMBIN TIME: 23.9 SECONDS

## 2025-08-26 PROCEDURE — 80061 LIPID PANEL: CPT

## 2025-08-26 PROCEDURE — 83036 HEMOGLOBIN GLYCOSYLATED A1C: CPT

## 2025-08-26 PROCEDURE — 84403 ASSAY OF TOTAL TESTOSTERONE: CPT

## 2025-08-26 PROCEDURE — 80053 COMPREHEN METABOLIC PANEL: CPT

## 2025-08-26 RX ORDER — TESTOSTERONE 30 MG/1.5ML
SOLUTION TOPICAL
Qty: 180 ML | Refills: 2 | Status: SHIPPED | OUTPATIENT
Start: 2025-08-26

## 2025-08-27 LAB
ALBUMIN SERPL-MCNC: 4.4 G/DL (ref 3.5–5.2)
ALBUMIN/GLOB SERPL: 1.3 G/DL
ALP SERPL-CCNC: 109 U/L (ref 39–117)
ALT SERPL W P-5'-P-CCNC: 18 U/L (ref 1–41)
ANION GAP SERPL CALCULATED.3IONS-SCNC: 11.1 MMOL/L (ref 5–15)
AST SERPL-CCNC: 19 U/L (ref 1–40)
BILIRUB SERPL-MCNC: 1.4 MG/DL (ref 0–1.2)
BUN SERPL-MCNC: 11 MG/DL (ref 8–23)
BUN/CREAT SERPL: 10 (ref 7–25)
CALCIUM SPEC-SCNC: 9.9 MG/DL (ref 8.6–10.5)
CHLORIDE SERPL-SCNC: 104 MMOL/L (ref 98–107)
CHOLEST SERPL-MCNC: 138 MG/DL (ref 0–200)
CO2 SERPL-SCNC: 24.9 MMOL/L (ref 22–29)
CREAT SERPL-MCNC: 1.1 MG/DL (ref 0.76–1.27)
EGFRCR SERPLBLD CKD-EPI 2021: 73.6 ML/MIN/1.73
GLOBULIN UR ELPH-MCNC: 3.4 GM/DL
GLUCOSE SERPL-MCNC: 91 MG/DL (ref 65–99)
HDLC SERPL-MCNC: 30 MG/DL (ref 40–60)
LDLC SERPL CALC-MCNC: 90 MG/DL (ref 0–100)
LDLC/HDLC SERPL: 2.96 {RATIO}
POTASSIUM SERPL-SCNC: 4.2 MMOL/L (ref 3.5–5.2)
PROT SERPL-MCNC: 7.8 G/DL (ref 6–8.5)
SODIUM SERPL-SCNC: 140 MMOL/L (ref 136–145)
TESTOST SERPL-MCNC: 518 NG/DL (ref 193–740)
TRIGL SERPL-MCNC: 96 MG/DL (ref 0–150)
VLDLC SERPL-MCNC: 18 MG/DL (ref 5–40)